# Patient Record
Sex: FEMALE | Race: WHITE
[De-identification: names, ages, dates, MRNs, and addresses within clinical notes are randomized per-mention and may not be internally consistent; named-entity substitution may affect disease eponyms.]

---

## 2018-11-28 ENCOUNTER — HOSPITAL ENCOUNTER (OUTPATIENT)
Age: 63
End: 2018-11-28
Payer: COMMERCIAL

## 2018-11-28 DIAGNOSIS — Z12.31: Primary | ICD-10-CM

## 2018-11-28 PROCEDURE — 77067 SCR MAMMO BI INCL CAD: CPT

## 2018-12-18 ENCOUNTER — HOSPITAL ENCOUNTER (OUTPATIENT)
Age: 63
End: 2018-12-18
Payer: COMMERCIAL

## 2018-12-18 DIAGNOSIS — D64.9: ICD-10-CM

## 2018-12-18 DIAGNOSIS — Z78.0: Primary | ICD-10-CM

## 2018-12-18 DIAGNOSIS — Z13.820: ICD-10-CM

## 2018-12-18 LAB
ALBUMIN LEVEL: 3.3 GM/DL (ref 3.4–5)
ALBUMIN/GLOB SERPL: 1 {RATIO} (ref 1.1–1.8)
ALP ISO SERPL-ACNC: 145 U/L (ref 46–116)
ALT SERPLBLD-CCNC: 37 U/L (ref 12–78)
ANION GAP SERPL CALC-SCNC: 16.8 MEQ/L (ref 5–15)
AST SERPL QL: 27 U/L (ref 15–37)
BILIRUBIN,TOTAL: 0.4 MG/DL (ref 0.2–1)
BUN SERPL-MCNC: 13 MG/DL (ref 7–18)
CALCIUM SPEC-MCNC: 9 MG/DL (ref 8.5–10.1)
CHLORIDE SPEC-SCNC: 102 MMOL/L (ref 98–107)
CO2 SERPL-SCNC: 26 MMOL/L (ref 21–32)
CREAT BLD-SCNC: 1.02 MG/DL (ref 0.55–1.02)
ESTIMATED GLOMERULAR FILT RATE: 55 ML/MIN (ref 60–?)
GFR (AFRICAN AMERICAN): 66 ML/MIN (ref 60–?)
GLOBULIN SER CALC-MCNC: 3.3 GM/DL (ref 1.3–3.2)
GLUCOSE: 115 MG/DL (ref 74–106)
HCT VFR BLD CALC: 41.1 % (ref 37–47)
HGB BLD-MCNC: 12.9 G/DL (ref 12.2–16.2)
MCHC RBC-ENTMCNC: 31.3 G/DL (ref 31.8–35.4)
MCV RBC: 82 FL (ref 81–99)
MEAN CORPUSCULAR HEMOGLOBIN: 25.7 PG (ref 27–31.2)
PLATELET # BLD: 334 K/MM3 (ref 142–424)
POTASSIUM: 3.8 MMOL/L (ref 3.5–5.1)
PROT SERPL-MCNC: 6.6 GM/DL (ref 6.4–8.2)
RBC # BLD AUTO: 5.01 M/MM3 (ref 4.2–5.4)
SODIUM SPEC-SCNC: 141 MMOL/L (ref 136–145)
TSH SERPL-ACNC: 0.68 UIU/ML (ref 0.36–3.74)
WBC # BLD AUTO: 6.6 K/MM3 (ref 4.8–10.8)

## 2018-12-18 PROCEDURE — 84443 ASSAY THYROID STIM HORMONE: CPT

## 2018-12-18 PROCEDURE — 36415 COLL VENOUS BLD VENIPUNCTURE: CPT

## 2018-12-18 PROCEDURE — 80053 COMPREHEN METABOLIC PANEL: CPT

## 2018-12-18 PROCEDURE — 85025 COMPLETE CBC W/AUTO DIFF WBC: CPT

## 2018-12-18 PROCEDURE — 77080 DXA BONE DENSITY AXIAL: CPT

## 2019-01-21 ENCOUNTER — HOSPITAL ENCOUNTER (OUTPATIENT)
Age: 64
End: 2019-01-21
Payer: COMMERCIAL

## 2019-01-21 DIAGNOSIS — R19.7: Primary | ICD-10-CM

## 2019-01-21 PROCEDURE — 87507 IADNA-DNA/RNA PROBE TQ 12-25: CPT

## 2019-10-24 ENCOUNTER — HOSPITAL ENCOUNTER (OUTPATIENT)
Age: 64
End: 2019-10-24
Payer: COMMERCIAL

## 2019-10-24 DIAGNOSIS — R40.0: ICD-10-CM

## 2019-10-24 DIAGNOSIS — R53.83: ICD-10-CM

## 2019-10-24 DIAGNOSIS — I10: ICD-10-CM

## 2019-10-24 DIAGNOSIS — G47.33: Primary | ICD-10-CM

## 2019-10-24 PROCEDURE — 95806 SLEEP STUDY UNATT&RESP EFFT: CPT

## 2019-12-30 ENCOUNTER — HOSPITAL ENCOUNTER (OUTPATIENT)
Age: 64
End: 2019-12-30
Payer: COMMERCIAL

## 2019-12-30 DIAGNOSIS — Z12.31: Primary | ICD-10-CM

## 2019-12-30 PROCEDURE — 77067 SCR MAMMO BI INCL CAD: CPT

## 2020-01-03 ENCOUNTER — HOSPITAL ENCOUNTER (OUTPATIENT)
Dept: HOSPITAL 22 - PT | Age: 65
Discharge: HOME | End: 2020-01-03
Payer: COMMERCIAL

## 2020-01-03 DIAGNOSIS — G45.9: ICD-10-CM

## 2020-01-03 DIAGNOSIS — M79.605: Primary | ICD-10-CM

## 2020-01-03 PROCEDURE — 97110 THERAPEUTIC EXERCISES: CPT

## 2020-01-03 PROCEDURE — 97163 PT EVAL HIGH COMPLEX 45 MIN: CPT

## 2020-01-14 ENCOUNTER — HOSPITAL ENCOUNTER (OUTPATIENT)
Age: 65
End: 2020-01-14
Payer: COMMERCIAL

## 2020-01-14 VITALS — BODY MASS INDEX: 53.9 KG/M2

## 2020-01-14 DIAGNOSIS — Z71.3: Primary | ICD-10-CM

## 2020-01-14 DIAGNOSIS — E66.9: ICD-10-CM

## 2020-01-14 DIAGNOSIS — E11.9: ICD-10-CM

## 2020-01-14 PROCEDURE — 97802 MEDICAL NUTRITION INDIV IN: CPT

## 2020-01-15 ENCOUNTER — HOSPITAL ENCOUNTER (OUTPATIENT)
Age: 65
End: 2020-01-15
Payer: COMMERCIAL

## 2020-01-15 DIAGNOSIS — Z13.820: ICD-10-CM

## 2020-01-15 DIAGNOSIS — Z78.0: Primary | ICD-10-CM

## 2020-01-15 PROCEDURE — 77080 DXA BONE DENSITY AXIAL: CPT

## 2020-01-17 ENCOUNTER — HOSPITAL ENCOUNTER (OUTPATIENT)
Age: 65
End: 2020-01-17
Payer: COMMERCIAL

## 2020-01-17 DIAGNOSIS — R06.00: ICD-10-CM

## 2020-01-17 DIAGNOSIS — R94.31: ICD-10-CM

## 2020-01-17 DIAGNOSIS — R07.9: ICD-10-CM

## 2020-01-17 DIAGNOSIS — R00.2: Primary | ICD-10-CM

## 2020-01-17 PROCEDURE — 93270 REMOTE 30 DAY ECG REV/REPORT: CPT

## 2020-01-22 ENCOUNTER — HOSPITAL ENCOUNTER (OUTPATIENT)
Age: 65
End: 2020-01-22
Payer: COMMERCIAL

## 2020-01-22 DIAGNOSIS — R06.02: Primary | ICD-10-CM

## 2020-01-22 DIAGNOSIS — Z13.6: ICD-10-CM

## 2020-01-22 PROCEDURE — 75571 CT HRT W/O DYE W/CA TEST: CPT

## 2020-01-24 ENCOUNTER — HOSPITAL ENCOUNTER (OUTPATIENT)
Age: 65
End: 2020-01-24
Payer: COMMERCIAL

## 2020-01-24 DIAGNOSIS — Z82.49: ICD-10-CM

## 2020-01-24 DIAGNOSIS — Z79.84: ICD-10-CM

## 2020-01-24 DIAGNOSIS — R94.31: ICD-10-CM

## 2020-01-24 DIAGNOSIS — R06.00: Primary | ICD-10-CM

## 2020-01-24 DIAGNOSIS — R42: ICD-10-CM

## 2020-01-24 DIAGNOSIS — E11.9: ICD-10-CM

## 2020-01-24 DIAGNOSIS — Z86.73: ICD-10-CM

## 2020-01-24 DIAGNOSIS — I10: ICD-10-CM

## 2020-01-24 DIAGNOSIS — R07.9: ICD-10-CM

## 2020-01-24 DIAGNOSIS — G47.33: ICD-10-CM

## 2020-01-24 PROCEDURE — 93880 EXTRACRANIAL BILAT STUDY: CPT

## 2020-01-24 PROCEDURE — 93306 TTE W/DOPPLER COMPLETE: CPT

## 2020-01-24 PROCEDURE — 78452 HT MUSCLE IMAGE SPECT MULT: CPT

## 2020-01-24 PROCEDURE — A9502 TC99M TETROFOSMIN: HCPCS

## 2020-01-24 PROCEDURE — 93017 CV STRESS TEST TRACING ONLY: CPT

## 2020-02-14 ENCOUNTER — HOSPITAL ENCOUNTER (OUTPATIENT)
Age: 65
End: 2020-02-14
Payer: COMMERCIAL

## 2020-02-14 DIAGNOSIS — R06.00: Primary | ICD-10-CM

## 2020-02-14 LAB
ANION GAP SERPL CALC-SCNC: 15.3 MEQ/L (ref 5–15)
BUN SERPL-MCNC: 16 MG/DL (ref 7–18)
CALCIUM SPEC-MCNC: 9.3 MG/DL (ref 8.5–10.1)
CHLORIDE SPEC-SCNC: 105 MMOL/L (ref 98–107)
CO2 SERPL-SCNC: 27 MMOL/L (ref 21–32)
CREAT BLD-SCNC: 1.06 MG/DL (ref 0.55–1.02)
ESTIMATED GLOMERULAR FILT RATE: 52 ML/MIN (ref 60–?)
GFR (AFRICAN AMERICAN): 63 ML/MIN (ref 60–?)
GLUCOSE: 97 MG/DL (ref 74–106)
POTASSIUM: 4.3 MMOL/L (ref 3.5–5.1)
SODIUM SPEC-SCNC: 143 MMOL/L (ref 137–145)

## 2020-02-14 PROCEDURE — 36415 COLL VENOUS BLD VENIPUNCTURE: CPT

## 2020-02-14 PROCEDURE — 80048 BASIC METABOLIC PNL TOTAL CA: CPT

## 2020-02-26 ENCOUNTER — HOSPITAL ENCOUNTER (OUTPATIENT)
Age: 65
End: 2020-02-26
Payer: COMMERCIAL

## 2020-02-26 DIAGNOSIS — M25.551: ICD-10-CM

## 2020-02-26 DIAGNOSIS — S99.922A: Primary | ICD-10-CM

## 2020-02-26 DIAGNOSIS — M54.41: ICD-10-CM

## 2020-02-26 PROCEDURE — 73502 X-RAY EXAM HIP UNI 2-3 VIEWS: CPT

## 2020-02-26 PROCEDURE — 72110 X-RAY EXAM L-2 SPINE 4/>VWS: CPT

## 2020-02-26 PROCEDURE — 73610 X-RAY EXAM OF ANKLE: CPT

## 2020-02-26 PROCEDURE — 73630 X-RAY EXAM OF FOOT: CPT

## 2020-02-27 ENCOUNTER — HOSPITAL ENCOUNTER (OUTPATIENT)
Dept: HOSPITAL 22 - PT | Age: 65
Discharge: HOME | End: 2020-02-27
Payer: COMMERCIAL

## 2020-02-27 DIAGNOSIS — S92.355A: Primary | ICD-10-CM

## 2020-02-27 PROCEDURE — 97760 ORTHOTIC MGMT&TRAING 1ST ENC: CPT

## 2020-03-18 ENCOUNTER — HOSPITAL ENCOUNTER (OUTPATIENT)
Age: 65
End: 2020-03-18
Payer: COMMERCIAL

## 2020-03-18 DIAGNOSIS — E78.2: ICD-10-CM

## 2020-03-18 DIAGNOSIS — E11.9: ICD-10-CM

## 2020-03-18 DIAGNOSIS — Z79.84: ICD-10-CM

## 2020-03-18 DIAGNOSIS — E03.9: Primary | ICD-10-CM

## 2020-03-18 DIAGNOSIS — E55.9: ICD-10-CM

## 2020-03-18 LAB
ALBUMIN LEVEL: 3.8 G/DL (ref 3.5–5)
ALBUMIN/GLOB SERPL: 1.6 {RATIO} (ref 1.1–1.8)
ALP ISO SERPL-ACNC: 138 U/L (ref 38–126)
ALT SERPLBLD-CCNC: 30 U/L (ref 12–78)
ANION GAP SERPL CALC-SCNC: 11.8 MEQ/L (ref 5–15)
AST SERPL QL: 29 U/L (ref 14–36)
BILIRUBIN,TOTAL: 0.4 MG/DL (ref 0.2–1.3)
BUN SERPL-MCNC: 13 MG/DL (ref 7–17)
CALCIUM SPEC-MCNC: 9.3 MG/DL (ref 8.4–10.2)
CHLORIDE SPEC-SCNC: 99 MMOL/L (ref 98–107)
CHOLEST SPEC-SCNC: 144 MG/DL (ref 140–200)
CO2 SERPL-SCNC: 31 MMOL/L (ref 22–30)
CREAT BLD-SCNC: 0.7 MG/DL (ref 0.52–1.04)
ESTIMATED GLOMERULAR FILT RATE: 85 ML/MIN (ref 60–?)
GFR (AFRICAN AMERICAN): 102 ML/MIN (ref 60–?)
GLOBULIN SER CALC-MCNC: 2.4 G/DL (ref 1.3–3.2)
GLUCOSE: 117 MG/DL (ref 74–100)
HBA1C MFR BLD: 6.1 % (ref 4–6)
HDLC SERPL-MCNC: 56 MG/DL (ref 40–60)
POTASSIUM: 3.8 MMOL/L (ref 3.5–5.1)
PROT SERPL-MCNC: 6.2 G/DL (ref 6.3–8.2)
SODIUM SPEC-SCNC: 138 MMOL/L (ref 136–145)
T4 FREE SERPL-MCNC: 1.12 NG/DL (ref 0.78–2.19)
TRIGLYCERIDES: 108 MG/DL (ref 30–150)
TSH SERPL-ACNC: 0.35 UIU/ML (ref 0.47–4.68)

## 2020-03-18 PROCEDURE — 84443 ASSAY THYROID STIM HORMONE: CPT

## 2020-03-18 PROCEDURE — 82607 VITAMIN B-12: CPT

## 2020-03-18 PROCEDURE — 83036 HEMOGLOBIN GLYCOSYLATED A1C: CPT

## 2020-03-18 PROCEDURE — 80053 COMPREHEN METABOLIC PANEL: CPT

## 2020-03-18 PROCEDURE — 36415 COLL VENOUS BLD VENIPUNCTURE: CPT

## 2020-03-18 PROCEDURE — 82652 VIT D 1 25-DIHYDROXY: CPT

## 2020-03-18 PROCEDURE — 80061 LIPID PANEL: CPT

## 2020-03-18 PROCEDURE — 84439 ASSAY OF FREE THYROXINE: CPT

## 2020-03-20 LAB — VITAMIN B12: 423 PG/ML (ref 232–1245)

## 2020-03-24 ENCOUNTER — HOSPITAL ENCOUNTER (OUTPATIENT)
Age: 65
End: 2020-03-24
Payer: COMMERCIAL

## 2020-03-24 DIAGNOSIS — S92.355D: Primary | ICD-10-CM

## 2020-03-24 PROCEDURE — 73630 X-RAY EXAM OF FOOT: CPT

## 2020-03-30 ENCOUNTER — HOSPITAL ENCOUNTER (OUTPATIENT)
Dept: HOSPITAL 22 - PT | Age: 65
Discharge: HOME | End: 2020-03-30
Payer: COMMERCIAL

## 2020-03-30 DIAGNOSIS — M25.551: ICD-10-CM

## 2020-03-30 DIAGNOSIS — M54.41: Primary | ICD-10-CM

## 2020-03-30 PROCEDURE — 97035 APP MDLTY 1+ULTRASOUND EA 15: CPT

## 2020-03-30 PROCEDURE — 97014 ELECTRIC STIMULATION THERAPY: CPT

## 2020-03-30 PROCEDURE — 97110 THERAPEUTIC EXERCISES: CPT

## 2020-03-30 PROCEDURE — 97010 HOT OR COLD PACKS THERAPY: CPT

## 2020-03-30 PROCEDURE — G0283 ELEC STIM OTHER THAN WOUND: HCPCS

## 2020-03-30 PROCEDURE — 97033 APP MDLTY 1+IONTPHRSIS EA 15: CPT

## 2020-03-30 PROCEDURE — 97140 MANUAL THERAPY 1/> REGIONS: CPT

## 2020-03-30 PROCEDURE — 97163 PT EVAL HIGH COMPLEX 45 MIN: CPT

## 2020-04-23 ENCOUNTER — HOSPITAL ENCOUNTER (OUTPATIENT)
Age: 65
End: 2020-04-23
Payer: COMMERCIAL

## 2020-04-23 DIAGNOSIS — S92.355A: Primary | ICD-10-CM

## 2020-04-23 PROCEDURE — 73630 X-RAY EXAM OF FOOT: CPT

## 2020-05-28 ENCOUNTER — HOSPITAL ENCOUNTER (OUTPATIENT)
Age: 65
End: 2020-05-28
Payer: COMMERCIAL

## 2020-05-28 DIAGNOSIS — S92.355D: Primary | ICD-10-CM

## 2020-05-28 PROCEDURE — 73630 X-RAY EXAM OF FOOT: CPT

## 2020-06-18 ENCOUNTER — HOSPITAL ENCOUNTER (OUTPATIENT)
Age: 65
End: 2020-06-18
Payer: COMMERCIAL

## 2020-06-18 DIAGNOSIS — S92.355D: Primary | ICD-10-CM

## 2020-06-18 PROCEDURE — 73630 X-RAY EXAM OF FOOT: CPT

## 2020-08-05 ENCOUNTER — HOSPITAL ENCOUNTER (OUTPATIENT)
Dept: HOSPITAL 22 - PT | Age: 65
Discharge: HOME | End: 2020-08-05
Payer: COMMERCIAL

## 2020-08-05 DIAGNOSIS — S92.355D: Primary | ICD-10-CM

## 2020-08-05 PROCEDURE — 97014 ELECTRIC STIMULATION THERAPY: CPT

## 2020-08-05 PROCEDURE — G0283 ELEC STIM OTHER THAN WOUND: HCPCS

## 2020-08-05 PROCEDURE — 97760 ORTHOTIC MGMT&TRAING 1ST ENC: CPT

## 2020-08-05 PROCEDURE — 97010 HOT OR COLD PACKS THERAPY: CPT

## 2020-08-05 PROCEDURE — 97163 PT EVAL HIGH COMPLEX 45 MIN: CPT

## 2020-08-05 PROCEDURE — 97110 THERAPEUTIC EXERCISES: CPT

## 2020-08-05 PROCEDURE — 97113 AQUATIC THERAPY/EXERCISES: CPT

## 2020-08-05 PROCEDURE — 97164 PT RE-EVAL EST PLAN CARE: CPT

## 2020-08-05 PROCEDURE — 97035 APP MDLTY 1+ULTRASOUND EA 15: CPT

## 2020-08-05 PROCEDURE — 97530 THERAPEUTIC ACTIVITIES: CPT

## 2020-08-05 PROCEDURE — 97140 MANUAL THERAPY 1/> REGIONS: CPT

## 2020-08-28 ENCOUNTER — HOSPITAL ENCOUNTER (OUTPATIENT)
Age: 65
End: 2020-08-28
Payer: COMMERCIAL

## 2020-08-28 DIAGNOSIS — Z79.84: ICD-10-CM

## 2020-08-28 DIAGNOSIS — I25.10: ICD-10-CM

## 2020-08-28 DIAGNOSIS — Z86.73: ICD-10-CM

## 2020-08-28 DIAGNOSIS — I35.8: ICD-10-CM

## 2020-08-28 DIAGNOSIS — G47.33: ICD-10-CM

## 2020-08-28 DIAGNOSIS — Z82.49: ICD-10-CM

## 2020-08-28 DIAGNOSIS — I35.0: ICD-10-CM

## 2020-08-28 DIAGNOSIS — E11.9: ICD-10-CM

## 2020-08-28 DIAGNOSIS — I10: ICD-10-CM

## 2020-08-28 DIAGNOSIS — E78.2: ICD-10-CM

## 2020-08-28 DIAGNOSIS — R00.2: ICD-10-CM

## 2020-08-28 DIAGNOSIS — I65.23: ICD-10-CM

## 2020-08-28 DIAGNOSIS — R06.02: Primary | ICD-10-CM

## 2020-08-28 LAB
ANION GAP SERPL CALC-SCNC: 15.9 MEQ/L (ref 5–15)
BUN SERPL-MCNC: 17 MG/DL (ref 7–17)
CALCIUM SPEC-MCNC: 9.4 MG/DL (ref 8.4–10.2)
CHLORIDE SPEC-SCNC: 101 MMOL/L (ref 98–107)
CO2 SERPL-SCNC: 27 MMOL/L (ref 22–30)
CREAT BLD-SCNC: 1.2 MG/DL (ref 0.52–1.04)
ESTIMATED GLOMERULAR FILT RATE: 45 ML/MIN (ref 60–?)
GFR (AFRICAN AMERICAN): 55 ML/MIN (ref 60–?)
GLUCOSE: 92 MG/DL (ref 74–100)
NT PRO BRAIN NATRIURETIC PEP.: 62.4 PG/ML (ref 0–125)
POTASSIUM: 4.9 MMOL/L (ref 3.5–5.1)
SODIUM SPEC-SCNC: 139 MMOL/L (ref 136–145)

## 2020-08-28 PROCEDURE — 80048 BASIC METABOLIC PNL TOTAL CA: CPT

## 2020-08-28 PROCEDURE — 83880 ASSAY OF NATRIURETIC PEPTIDE: CPT

## 2020-08-28 PROCEDURE — 36415 COLL VENOUS BLD VENIPUNCTURE: CPT

## 2020-10-02 ENCOUNTER — HOSPITAL ENCOUNTER (EMERGENCY)
Age: 65
Discharge: HOME | End: 2020-10-02
Payer: COMMERCIAL

## 2020-10-02 VITALS
HEART RATE: 65 BPM | SYSTOLIC BLOOD PRESSURE: 130 MMHG | RESPIRATION RATE: 17 BRPM | OXYGEN SATURATION: 95 % | DIASTOLIC BLOOD PRESSURE: 70 MMHG | TEMPERATURE: 98.24 F

## 2020-10-02 VITALS
TEMPERATURE: 98 F | HEART RATE: 77 BPM | SYSTOLIC BLOOD PRESSURE: 136 MMHG | RESPIRATION RATE: 18 BRPM | OXYGEN SATURATION: 93 % | DIASTOLIC BLOOD PRESSURE: 80 MMHG

## 2020-10-02 VITALS
HEART RATE: 67 BPM | RESPIRATION RATE: 20 BRPM | SYSTOLIC BLOOD PRESSURE: 132 MMHG | OXYGEN SATURATION: 96 % | DIASTOLIC BLOOD PRESSURE: 71 MMHG

## 2020-10-02 VITALS — BODY MASS INDEX: 55.5 KG/M2

## 2020-10-02 DIAGNOSIS — Z90.710: ICD-10-CM

## 2020-10-02 DIAGNOSIS — I10: ICD-10-CM

## 2020-10-02 DIAGNOSIS — K21.9: ICD-10-CM

## 2020-10-02 DIAGNOSIS — Z79.899: ICD-10-CM

## 2020-10-02 DIAGNOSIS — E78.5: ICD-10-CM

## 2020-10-02 DIAGNOSIS — J45.909: ICD-10-CM

## 2020-10-02 DIAGNOSIS — F33.1: ICD-10-CM

## 2020-10-02 DIAGNOSIS — M54.16: Primary | ICD-10-CM

## 2020-10-02 DIAGNOSIS — Z90.49: ICD-10-CM

## 2020-10-02 DIAGNOSIS — Z86.73: ICD-10-CM

## 2020-10-02 DIAGNOSIS — E03.9: ICD-10-CM

## 2020-10-02 DIAGNOSIS — E11.9: ICD-10-CM

## 2020-10-02 PROCEDURE — 99282 EMERGENCY DEPT VISIT SF MDM: CPT

## 2020-10-23 ENCOUNTER — HOSPITAL ENCOUNTER (OUTPATIENT)
Age: 65
End: 2020-10-23
Payer: COMMERCIAL

## 2020-10-23 DIAGNOSIS — I10: ICD-10-CM

## 2020-10-23 DIAGNOSIS — I65.29: ICD-10-CM

## 2020-10-23 DIAGNOSIS — I25.10: ICD-10-CM

## 2020-10-23 DIAGNOSIS — R06.00: Primary | ICD-10-CM

## 2020-10-23 DIAGNOSIS — E78.5: ICD-10-CM

## 2020-10-23 DIAGNOSIS — I35.8: ICD-10-CM

## 2020-10-23 LAB
ANION GAP SERPL CALC-SCNC: 13.7 MEQ/L (ref 5–15)
BUN SERPL-MCNC: 14 MG/DL (ref 7–17)
CALCIUM SPEC-MCNC: 9.3 MG/DL (ref 8.4–10.2)
CHLORIDE SPEC-SCNC: 102 MMOL/L (ref 98–107)
CO2 SERPL-SCNC: 27 MMOL/L (ref 22–30)
CREAT BLD-SCNC: 0.8 MG/DL (ref 0.52–1.04)
ESTIMATED GLOMERULAR FILT RATE: 72 ML/MIN (ref 60–?)
GFR (AFRICAN AMERICAN): 87 ML/MIN (ref 60–?)
GLUCOSE: 116 MG/DL (ref 74–100)
POTASSIUM: 4.7 MMOL/L (ref 3.5–5.1)
SODIUM SPEC-SCNC: 138 MMOL/L (ref 136–145)

## 2020-10-23 PROCEDURE — 80048 BASIC METABOLIC PNL TOTAL CA: CPT

## 2020-10-23 PROCEDURE — 36415 COLL VENOUS BLD VENIPUNCTURE: CPT

## 2020-11-24 ENCOUNTER — HOSPITAL ENCOUNTER (OUTPATIENT)
Age: 65
End: 2020-11-24
Payer: COMMERCIAL

## 2020-11-24 DIAGNOSIS — R00.2: ICD-10-CM

## 2020-11-24 DIAGNOSIS — I65.29: ICD-10-CM

## 2020-11-24 DIAGNOSIS — E11.9: ICD-10-CM

## 2020-11-24 DIAGNOSIS — I25.10: ICD-10-CM

## 2020-11-24 DIAGNOSIS — Z79.84: ICD-10-CM

## 2020-11-24 DIAGNOSIS — E78.5: ICD-10-CM

## 2020-11-24 DIAGNOSIS — I10: ICD-10-CM

## 2020-11-24 DIAGNOSIS — Z86.73: ICD-10-CM

## 2020-11-24 DIAGNOSIS — I35.0: ICD-10-CM

## 2020-11-24 DIAGNOSIS — I35.8: ICD-10-CM

## 2020-11-24 DIAGNOSIS — R06.00: Primary | ICD-10-CM

## 2020-11-24 DIAGNOSIS — G47.33: ICD-10-CM

## 2020-11-24 DIAGNOSIS — R60.9: ICD-10-CM

## 2020-11-24 PROCEDURE — 93306 TTE W/DOPPLER COMPLETE: CPT

## 2020-12-04 ENCOUNTER — HOSPITAL ENCOUNTER (OUTPATIENT)
Age: 65
End: 2020-12-04
Payer: COMMERCIAL

## 2020-12-04 DIAGNOSIS — Z03.818: Primary | ICD-10-CM

## 2020-12-04 LAB
HCT VFR BLD CALC: 43.5 % (ref 37–47)
HGB BLD-MCNC: 13.2 G/DL (ref 12.2–16.2)
MCHC RBC-ENTMCNC: 30.3 G/DL (ref 31.8–35.4)
MCV RBC: 81.4 FL (ref 81–99)
MEAN CORPUSCULAR HEMOGLOBIN: 24.7 PG (ref 27–31.2)
PLATELET # BLD: 430 K/MM3 (ref 142–424)
RBC # BLD AUTO: 5.35 M/MM3 (ref 4.2–5.4)
WBC # BLD AUTO: 10.1 K/MM3 (ref 4.8–10.8)

## 2020-12-04 PROCEDURE — 36415 COLL VENOUS BLD VENIPUNCTURE: CPT

## 2020-12-04 PROCEDURE — U0004 COV-19 TEST NON-CDC HGH THRU: HCPCS

## 2020-12-04 PROCEDURE — 85025 COMPLETE CBC W/AUTO DIFF WBC: CPT

## 2020-12-18 ENCOUNTER — HOSPITAL ENCOUNTER (OUTPATIENT)
Age: 65
End: 2020-12-18
Payer: COMMERCIAL

## 2020-12-18 DIAGNOSIS — I65.23: ICD-10-CM

## 2020-12-18 DIAGNOSIS — Z82.49: ICD-10-CM

## 2020-12-18 DIAGNOSIS — I25.10: ICD-10-CM

## 2020-12-18 DIAGNOSIS — R06.02: Primary | ICD-10-CM

## 2020-12-18 DIAGNOSIS — I35.8: ICD-10-CM

## 2020-12-18 DIAGNOSIS — G47.33: ICD-10-CM

## 2020-12-18 DIAGNOSIS — Z79.84: ICD-10-CM

## 2020-12-18 DIAGNOSIS — R00.2: ICD-10-CM

## 2020-12-18 DIAGNOSIS — E11.9: ICD-10-CM

## 2020-12-18 DIAGNOSIS — R60.9: ICD-10-CM

## 2020-12-18 DIAGNOSIS — I10: ICD-10-CM

## 2020-12-18 DIAGNOSIS — Z86.73: ICD-10-CM

## 2020-12-18 DIAGNOSIS — E78.2: ICD-10-CM

## 2020-12-18 DIAGNOSIS — I35.0: ICD-10-CM

## 2020-12-18 LAB
ANION GAP SERPL CALC-SCNC: 13.6 MEQ/L (ref 5–15)
BUN SERPL-MCNC: 16 MG/DL (ref 7–17)
CALCIUM SPEC-MCNC: 9.8 MG/DL (ref 8.4–10.2)
CHLORIDE SPEC-SCNC: 103 MMOL/L (ref 98–107)
CO2 SERPL-SCNC: 27 MMOL/L (ref 22–30)
CREAT BLD-SCNC: 0.9 MG/DL (ref 0.52–1.04)
ESTIMATED GLOMERULAR FILT RATE: 63 ML/MIN (ref 60–?)
GFR (AFRICAN AMERICAN): 76 ML/MIN (ref 60–?)
GLUCOSE: 131 MG/DL (ref 74–100)
POTASSIUM: 4.6 MMOL/L (ref 3.5–5.1)
SODIUM SPEC-SCNC: 139 MMOL/L (ref 136–145)

## 2020-12-18 PROCEDURE — 36415 COLL VENOUS BLD VENIPUNCTURE: CPT

## 2020-12-18 PROCEDURE — 80048 BASIC METABOLIC PNL TOTAL CA: CPT

## 2020-12-31 ENCOUNTER — HOSPITAL ENCOUNTER (OUTPATIENT)
Age: 65
End: 2020-12-31
Payer: COMMERCIAL

## 2020-12-31 DIAGNOSIS — Z12.31: Primary | ICD-10-CM

## 2020-12-31 PROCEDURE — 77063 BREAST TOMOSYNTHESIS BI: CPT

## 2020-12-31 PROCEDURE — 77067 SCR MAMMO BI INCL CAD: CPT

## 2021-01-04 ENCOUNTER — HOSPITAL ENCOUNTER (OUTPATIENT)
Age: 66
End: 2021-01-04
Payer: COMMERCIAL

## 2021-01-04 DIAGNOSIS — Z79.84: ICD-10-CM

## 2021-01-04 DIAGNOSIS — J02.9: ICD-10-CM

## 2021-01-04 DIAGNOSIS — Z03.818: Primary | ICD-10-CM

## 2021-01-04 DIAGNOSIS — E11.9: ICD-10-CM

## 2021-01-04 LAB
ALBUMIN LEVEL: 3.9 G/DL (ref 3.5–5)
ALBUMIN/GLOB SERPL: 1.4 {RATIO} (ref 1.1–1.8)
ALP ISO SERPL-ACNC: 169 U/L (ref 38–126)
ALT SERPLBLD-CCNC: 41 U/L (ref 12–78)
ANION GAP SERPL CALC-SCNC: 13 MEQ/L (ref 5–15)
AST SERPL QL: 38 U/L (ref 14–36)
BILIRUBIN,TOTAL: 0.6 MG/DL (ref 0.2–1.3)
BUN SERPL-MCNC: 10 MG/DL (ref 7–17)
CALCIUM SPEC-MCNC: 9.5 MG/DL (ref 8.4–10.2)
CHLORIDE SPEC-SCNC: 99 MMOL/L (ref 98–107)
CO2 SERPL-SCNC: 26 MMOL/L (ref 22–30)
CREAT BLD-SCNC: 0.8 MG/DL (ref 0.52–1.04)
ESTIMATED GLOMERULAR FILT RATE: 72 ML/MIN (ref 60–?)
GFR (AFRICAN AMERICAN): 87 ML/MIN (ref 60–?)
GLOBULIN SER CALC-MCNC: 2.8 G/DL (ref 1.3–3.2)
GLUCOSE: 108 MG/DL (ref 74–100)
HBA1C MFR BLD: 6.6 % (ref 4–6)
HCT VFR BLD CALC: 39.7 % (ref 37–47)
HGB BLD-MCNC: 12.6 G/DL (ref 12.2–16.2)
MCHC RBC-ENTMCNC: 31.8 G/DL (ref 31.8–35.4)
MCV RBC: 79.9 FL (ref 81–99)
MEAN CORPUSCULAR HEMOGLOBIN: 25.4 PG (ref 27–31.2)
PLATELET # BLD: 437 K/MM3 (ref 142–424)
POTASSIUM: 4 MMOL/L (ref 3.5–5.1)
PROT SERPL-MCNC: 6.7 G/DL (ref 6.3–8.2)
RBC # BLD AUTO: 4.97 M/MM3 (ref 4.2–5.4)
SODIUM SPEC-SCNC: 134 MMOL/L (ref 136–145)
WBC # BLD AUTO: 10.1 K/MM3 (ref 4.8–10.8)

## 2021-01-04 PROCEDURE — 87430 STREP A AG IA: CPT

## 2021-01-04 PROCEDURE — 71045 X-RAY EXAM CHEST 1 VIEW: CPT

## 2021-01-04 PROCEDURE — 83036 HEMOGLOBIN GLYCOSYLATED A1C: CPT

## 2021-01-04 PROCEDURE — U0003 INFECTIOUS AGENT DETECTION BY NUCLEIC ACID (DNA OR RNA); SEVERE ACUTE RESPIRATORY SYNDROME CORONAVIRUS 2 (SARS-COV-2) (CORONAVIRUS DISEASE [COVID-19]), AMPLIFIED PROBE TECHNIQUE, MAKING USE OF HIGH THROUGHPUT TECHNOLOGIES AS DESCRIBED BY CMS-2020-01-R: HCPCS

## 2021-01-04 PROCEDURE — 85025 COMPLETE CBC W/AUTO DIFF WBC: CPT

## 2021-01-04 PROCEDURE — 36415 COLL VENOUS BLD VENIPUNCTURE: CPT

## 2021-01-04 PROCEDURE — 80053 COMPREHEN METABOLIC PANEL: CPT

## 2021-01-22 ENCOUNTER — HOSPITAL ENCOUNTER (OUTPATIENT)
Age: 66
End: 2021-01-22
Payer: COMMERCIAL

## 2021-01-22 DIAGNOSIS — Z79.84: ICD-10-CM

## 2021-01-22 DIAGNOSIS — I65.29: ICD-10-CM

## 2021-01-22 DIAGNOSIS — R06.00: Primary | ICD-10-CM

## 2021-01-22 DIAGNOSIS — I35.8: ICD-10-CM

## 2021-01-22 DIAGNOSIS — E11.9: ICD-10-CM

## 2021-01-22 DIAGNOSIS — G47.33: ICD-10-CM

## 2021-01-22 DIAGNOSIS — Z86.73: ICD-10-CM

## 2021-01-22 DIAGNOSIS — Z82.49: ICD-10-CM

## 2021-01-22 DIAGNOSIS — E78.5: ICD-10-CM

## 2021-01-22 DIAGNOSIS — I35.0: ICD-10-CM

## 2021-01-22 DIAGNOSIS — R60.9: ICD-10-CM

## 2021-01-22 DIAGNOSIS — I25.10: ICD-10-CM

## 2021-01-22 DIAGNOSIS — I10: ICD-10-CM

## 2021-01-22 DIAGNOSIS — R00.2: ICD-10-CM

## 2021-01-22 LAB
ANION GAP SERPL CALC-SCNC: 13.4 MEQ/L (ref 5–15)
BUN SERPL-MCNC: 14 MG/DL (ref 7–17)
CALCIUM SPEC-MCNC: 9.2 MG/DL (ref 8.4–10.2)
CHLORIDE SPEC-SCNC: 104 MMOL/L (ref 98–107)
CO2 SERPL-SCNC: 25 MMOL/L (ref 22–30)
CREAT BLD-SCNC: 0.9 MG/DL (ref 0.52–1.04)
ESTIMATED GLOMERULAR FILT RATE: 63 ML/MIN (ref 60–?)
GFR (AFRICAN AMERICAN): 76 ML/MIN (ref 60–?)
GLUCOSE: 102 MG/DL (ref 74–100)
MAGNESIUM: 1.9 MG/DL (ref 1.6–2.3)
POTASSIUM: 4.4 MMOL/L (ref 3.5–5.1)
SODIUM SPEC-SCNC: 138 MMOL/L (ref 136–145)

## 2021-01-22 PROCEDURE — 36415 COLL VENOUS BLD VENIPUNCTURE: CPT

## 2021-01-22 PROCEDURE — 83735 ASSAY OF MAGNESIUM: CPT

## 2021-01-22 PROCEDURE — 80048 BASIC METABOLIC PNL TOTAL CA: CPT

## 2021-02-19 ENCOUNTER — HOSPITAL ENCOUNTER (OUTPATIENT)
Age: 66
End: 2021-02-19
Payer: COMMERCIAL

## 2021-02-19 DIAGNOSIS — I25.10: ICD-10-CM

## 2021-02-19 DIAGNOSIS — E78.5: Primary | ICD-10-CM

## 2021-02-19 DIAGNOSIS — I35.8: ICD-10-CM

## 2021-02-19 DIAGNOSIS — I10: ICD-10-CM

## 2021-02-19 DIAGNOSIS — I65.29: ICD-10-CM

## 2021-02-19 DIAGNOSIS — R06.00: ICD-10-CM

## 2021-02-19 LAB
ANION GAP SERPL CALC-SCNC: 13.7 MEQ/L (ref 5–15)
BUN SERPL-MCNC: 16 MG/DL (ref 7–17)
CALCIUM SPEC-MCNC: 9.8 MG/DL (ref 8.4–10.2)
CHLORIDE SPEC-SCNC: 103 MMOL/L (ref 98–107)
CO2 SERPL-SCNC: 27 MMOL/L (ref 22–30)
CREAT BLD-SCNC: 1 MG/DL (ref 0.52–1.04)
ESTIMATED GLOMERULAR FILT RATE: 56 ML/MIN (ref 60–?)
GFR (AFRICAN AMERICAN): 68 ML/MIN (ref 60–?)
GLUCOSE: 152 MG/DL (ref 74–100)
POTASSIUM: 4.7 MMOL/L (ref 3.5–5.1)
SODIUM SPEC-SCNC: 139 MMOL/L (ref 136–145)

## 2021-02-19 PROCEDURE — 36415 COLL VENOUS BLD VENIPUNCTURE: CPT

## 2021-02-19 PROCEDURE — 80048 BASIC METABOLIC PNL TOTAL CA: CPT

## 2021-02-26 ENCOUNTER — HOSPITAL ENCOUNTER (OUTPATIENT)
Age: 66
End: 2021-02-26
Payer: COMMERCIAL

## 2021-02-26 DIAGNOSIS — R06.02: ICD-10-CM

## 2021-02-26 DIAGNOSIS — Z20.822: ICD-10-CM

## 2021-02-26 DIAGNOSIS — I27.20: ICD-10-CM

## 2021-02-26 DIAGNOSIS — Z01.810: Primary | ICD-10-CM

## 2021-02-26 LAB
ANION GAP SERPL CALC-SCNC: 14.6 MEQ/L (ref 5–15)
BUN SERPL-MCNC: 21 MG/DL (ref 7–17)
CALCIUM SPEC-MCNC: 9.6 MG/DL (ref 8.4–10.2)
CHLORIDE SPEC-SCNC: 101 MMOL/L (ref 98–107)
CO2 SERPL-SCNC: 29 MMOL/L (ref 22–30)
CREAT BLD-SCNC: 1.4 MG/DL (ref 0.52–1.04)
ESTIMATED GLOMERULAR FILT RATE: 38 ML/MIN (ref 60–?)
GFR (AFRICAN AMERICAN): 46 ML/MIN (ref 60–?)
GLUCOSE: 124 MG/DL (ref 74–100)
HCT VFR BLD CALC: 41 % (ref 37–47)
HGB BLD-MCNC: 12.4 G/DL (ref 12.2–16.2)
MCHC RBC-ENTMCNC: 30.2 G/DL (ref 31.8–35.4)
MCV RBC: 81.2 FL (ref 81–99)
MEAN CORPUSCULAR HEMOGLOBIN: 24.5 PG (ref 27–31.2)
PLATELET # BLD: 443 K/MM3 (ref 142–424)
POTASSIUM: 4.6 MMOL/L (ref 3.5–5.1)
RBC # BLD AUTO: 5.05 M/MM3 (ref 4.2–5.4)
SODIUM SPEC-SCNC: 140 MMOL/L (ref 136–145)
WBC # BLD AUTO: 11.3 K/MM3 (ref 4.8–10.8)

## 2021-02-26 PROCEDURE — 80048 BASIC METABOLIC PNL TOTAL CA: CPT

## 2021-02-26 PROCEDURE — 36415 COLL VENOUS BLD VENIPUNCTURE: CPT

## 2021-02-26 PROCEDURE — 85025 COMPLETE CBC W/AUTO DIFF WBC: CPT

## 2021-02-26 PROCEDURE — 86328 IA NFCT AB SARSCOV2 COVID19: CPT

## 2021-03-01 ENCOUNTER — HOSPITAL ENCOUNTER (OUTPATIENT)
Dept: HOSPITAL 22 - CATHLAB | Age: 66
Discharge: HOME | End: 2021-03-01
Payer: COMMERCIAL

## 2021-03-01 VITALS
DIASTOLIC BLOOD PRESSURE: 57 MMHG | HEART RATE: 72 BPM | RESPIRATION RATE: 18 BRPM | TEMPERATURE: 97.8 F | SYSTOLIC BLOOD PRESSURE: 98 MMHG | OXYGEN SATURATION: 96 %

## 2021-03-01 VITALS
OXYGEN SATURATION: 97 % | RESPIRATION RATE: 16 BRPM | HEART RATE: 72 BPM | DIASTOLIC BLOOD PRESSURE: 46 MMHG | SYSTOLIC BLOOD PRESSURE: 105 MMHG

## 2021-03-01 VITALS
DIASTOLIC BLOOD PRESSURE: 66 MMHG | HEART RATE: 64 BPM | OXYGEN SATURATION: 96 % | SYSTOLIC BLOOD PRESSURE: 103 MMHG | RESPIRATION RATE: 17 BRPM

## 2021-03-01 VITALS
RESPIRATION RATE: 16 BRPM | SYSTOLIC BLOOD PRESSURE: 90 MMHG | HEART RATE: 75 BPM | OXYGEN SATURATION: 96 % | DIASTOLIC BLOOD PRESSURE: 50 MMHG

## 2021-03-01 VITALS
DIASTOLIC BLOOD PRESSURE: 63 MMHG | HEART RATE: 66 BPM | RESPIRATION RATE: 15 BRPM | SYSTOLIC BLOOD PRESSURE: 98 MMHG | OXYGEN SATURATION: 96 %

## 2021-03-01 VITALS
SYSTOLIC BLOOD PRESSURE: 142 MMHG | TEMPERATURE: 97.6 F | OXYGEN SATURATION: 99 % | HEART RATE: 75 BPM | RESPIRATION RATE: 18 BRPM | DIASTOLIC BLOOD PRESSURE: 79 MMHG

## 2021-03-01 VITALS
HEART RATE: 72 BPM | SYSTOLIC BLOOD PRESSURE: 90 MMHG | RESPIRATION RATE: 17 BRPM | DIASTOLIC BLOOD PRESSURE: 52 MMHG | OXYGEN SATURATION: 96 %

## 2021-03-01 VITALS
HEART RATE: 70 BPM | SYSTOLIC BLOOD PRESSURE: 100 MMHG | DIASTOLIC BLOOD PRESSURE: 56 MMHG | OXYGEN SATURATION: 98 % | RESPIRATION RATE: 18 BRPM

## 2021-03-01 VITALS — BODY MASS INDEX: 57.5 KG/M2

## 2021-03-01 VITALS
DIASTOLIC BLOOD PRESSURE: 55 MMHG | RESPIRATION RATE: 17 BRPM | OXYGEN SATURATION: 95 % | SYSTOLIC BLOOD PRESSURE: 91 MMHG | HEART RATE: 68 BPM

## 2021-03-01 VITALS
RESPIRATION RATE: 17 BRPM | DIASTOLIC BLOOD PRESSURE: 55 MMHG | SYSTOLIC BLOOD PRESSURE: 97 MMHG | OXYGEN SATURATION: 97 % | HEART RATE: 68 BPM

## 2021-03-01 VITALS
DIASTOLIC BLOOD PRESSURE: 61 MMHG | SYSTOLIC BLOOD PRESSURE: 91 MMHG | HEART RATE: 66 BPM | RESPIRATION RATE: 19 BRPM | OXYGEN SATURATION: 97 %

## 2021-03-01 VITALS — HEART RATE: 75 BPM

## 2021-03-01 DIAGNOSIS — I35.0: ICD-10-CM

## 2021-03-01 DIAGNOSIS — R06.02: ICD-10-CM

## 2021-03-01 DIAGNOSIS — I10: ICD-10-CM

## 2021-03-01 DIAGNOSIS — I27.20: ICD-10-CM

## 2021-03-01 DIAGNOSIS — R00.2: ICD-10-CM

## 2021-03-01 DIAGNOSIS — E11.9: ICD-10-CM

## 2021-03-01 DIAGNOSIS — I65.23: ICD-10-CM

## 2021-03-01 DIAGNOSIS — I35.8: ICD-10-CM

## 2021-03-01 DIAGNOSIS — Z86.73: ICD-10-CM

## 2021-03-01 DIAGNOSIS — G47.33: ICD-10-CM

## 2021-03-01 DIAGNOSIS — Z79.899: ICD-10-CM

## 2021-03-01 DIAGNOSIS — I25.118: Primary | ICD-10-CM

## 2021-03-01 DIAGNOSIS — E78.2: ICD-10-CM

## 2021-03-01 DIAGNOSIS — R60.9: ICD-10-CM

## 2021-03-01 DIAGNOSIS — Z79.84: ICD-10-CM

## 2021-03-01 DIAGNOSIS — Z82.49: ICD-10-CM

## 2021-03-01 LAB
CATHL VENOUS O2 SAT: 64 % (ref 75–80)
SAO2 % BLDA: 62 % (ref 90–100)

## 2021-03-01 PROCEDURE — C1894 INTRO/SHEATH, NON-LASER: HCPCS

## 2021-03-01 PROCEDURE — 93460 R&L HRT ART/VENTRICLE ANGIO: CPT

## 2021-03-01 PROCEDURE — 82810 BLOOD GASES O2 SAT ONLY: CPT

## 2021-03-01 PROCEDURE — 99153 MOD SED SAME PHYS/QHP EA: CPT

## 2021-03-01 PROCEDURE — 99152 MOD SED SAME PHYS/QHP 5/>YRS: CPT

## 2021-03-01 PROCEDURE — C1760 CLOSURE DEV, VASC: HCPCS

## 2021-03-01 PROCEDURE — C1725 CATH, TRANSLUMIN NON-LASER: HCPCS

## 2021-03-01 PROCEDURE — C1769 GUIDE WIRE: HCPCS

## 2021-03-05 ENCOUNTER — HOSPITAL ENCOUNTER (OUTPATIENT)
Age: 66
End: 2021-03-05
Payer: COMMERCIAL

## 2021-03-05 DIAGNOSIS — I65.23: ICD-10-CM

## 2021-03-05 DIAGNOSIS — I35.8: ICD-10-CM

## 2021-03-05 DIAGNOSIS — I35.0: ICD-10-CM

## 2021-03-05 DIAGNOSIS — I20.8: ICD-10-CM

## 2021-03-05 DIAGNOSIS — R00.2: ICD-10-CM

## 2021-03-05 DIAGNOSIS — I25.10: ICD-10-CM

## 2021-03-05 DIAGNOSIS — Z82.49: ICD-10-CM

## 2021-03-05 DIAGNOSIS — E11.9: Primary | ICD-10-CM

## 2021-03-05 DIAGNOSIS — R60.9: ICD-10-CM

## 2021-03-05 DIAGNOSIS — I10: ICD-10-CM

## 2021-03-05 DIAGNOSIS — R06.02: ICD-10-CM

## 2021-03-05 DIAGNOSIS — Z86.73: ICD-10-CM

## 2021-03-05 DIAGNOSIS — G47.33: ICD-10-CM

## 2021-03-05 DIAGNOSIS — E78.2: ICD-10-CM

## 2021-03-05 DIAGNOSIS — I27.20: ICD-10-CM

## 2021-03-05 LAB
ANION GAP SERPL CALC-SCNC: 15.5 MEQ/L (ref 5–15)
BUN SERPL-MCNC: 14 MG/DL (ref 7–17)
CALCIUM SPEC-MCNC: 9.5 MG/DL (ref 8.4–10.2)
CHLORIDE SPEC-SCNC: 106 MMOL/L (ref 98–107)
CO2 SERPL-SCNC: 21 MMOL/L (ref 22–30)
CREAT BLD-SCNC: 1.2 MG/DL (ref 0.52–1.04)
ESTIMATED GLOMERULAR FILT RATE: 45 ML/MIN (ref 60–?)
GFR (AFRICAN AMERICAN): 55 ML/MIN (ref 60–?)
GLUCOSE: 117 MG/DL (ref 74–100)
POTASSIUM: 4.5 MMOL/L (ref 3.5–5.1)
SODIUM SPEC-SCNC: 138 MMOL/L (ref 136–145)

## 2021-03-05 PROCEDURE — 80048 BASIC METABOLIC PNL TOTAL CA: CPT

## 2021-03-05 PROCEDURE — 36415 COLL VENOUS BLD VENIPUNCTURE: CPT

## 2021-03-17 ENCOUNTER — HOSPITAL ENCOUNTER (OUTPATIENT)
Age: 66
End: 2021-03-17
Payer: COMMERCIAL

## 2021-03-17 DIAGNOSIS — I27.20: ICD-10-CM

## 2021-03-17 DIAGNOSIS — Z86.73: ICD-10-CM

## 2021-03-17 DIAGNOSIS — I35.0: ICD-10-CM

## 2021-03-17 DIAGNOSIS — R06.00: Primary | ICD-10-CM

## 2021-03-17 DIAGNOSIS — I25.10: ICD-10-CM

## 2021-03-17 DIAGNOSIS — R00.2: ICD-10-CM

## 2021-03-17 DIAGNOSIS — G47.33: ICD-10-CM

## 2021-03-17 DIAGNOSIS — Z82.49: ICD-10-CM

## 2021-03-17 DIAGNOSIS — R60.9: ICD-10-CM

## 2021-03-17 DIAGNOSIS — I35.8: ICD-10-CM

## 2021-03-17 DIAGNOSIS — I20.8: ICD-10-CM

## 2021-03-17 DIAGNOSIS — Z79.84: ICD-10-CM

## 2021-03-17 DIAGNOSIS — E11.9: ICD-10-CM

## 2021-03-17 DIAGNOSIS — I65.29: ICD-10-CM

## 2021-03-17 DIAGNOSIS — I10: ICD-10-CM

## 2021-03-17 DIAGNOSIS — E78.5: ICD-10-CM

## 2021-03-17 LAB
ANION GAP SERPL CALC-SCNC: 16.7 MEQ/L (ref 5–15)
BUN SERPL-MCNC: 19 MG/DL (ref 7–17)
CALCIUM SPEC-MCNC: 9.3 MG/DL (ref 8.4–10.2)
CHLORIDE SPEC-SCNC: 98 MMOL/L (ref 98–107)
CO2 SERPL-SCNC: 27 MMOL/L (ref 22–30)
CREAT BLD-SCNC: 0.9 MG/DL (ref 0.52–1.04)
ESTIMATED GLOMERULAR FILT RATE: 63 ML/MIN (ref 60–?)
GFR (AFRICAN AMERICAN): 76 ML/MIN (ref 60–?)
GLUCOSE: 92 MG/DL (ref 74–100)
POTASSIUM: 3.7 MMOL/L (ref 3.5–5.1)
SODIUM SPEC-SCNC: 138 MMOL/L (ref 136–145)

## 2021-03-17 PROCEDURE — 80048 BASIC METABOLIC PNL TOTAL CA: CPT

## 2021-03-17 PROCEDURE — 36415 COLL VENOUS BLD VENIPUNCTURE: CPT

## 2021-03-22 ENCOUNTER — HOSPITAL ENCOUNTER (OUTPATIENT)
Age: 66
End: 2021-03-22
Payer: COMMERCIAL

## 2021-03-22 DIAGNOSIS — E11.9: ICD-10-CM

## 2021-03-22 DIAGNOSIS — Z01.818: Primary | ICD-10-CM

## 2021-03-22 DIAGNOSIS — Z20.822: ICD-10-CM

## 2021-03-22 DIAGNOSIS — I35.0: ICD-10-CM

## 2021-03-22 DIAGNOSIS — I25.10: ICD-10-CM

## 2021-03-22 DIAGNOSIS — I65.23: ICD-10-CM

## 2021-03-22 DIAGNOSIS — I10: ICD-10-CM

## 2021-03-22 DIAGNOSIS — R06.02: ICD-10-CM

## 2021-03-22 DIAGNOSIS — R60.9: ICD-10-CM

## 2021-03-22 DIAGNOSIS — E78.2: ICD-10-CM

## 2021-03-22 LAB
ANION GAP SERPL CALC-SCNC: 16.8 MEQ/L (ref 5–15)
BUN SERPL-MCNC: 15 MG/DL (ref 7–17)
CALCIUM SPEC-MCNC: 9.6 MG/DL (ref 8.4–10.2)
CHLORIDE SPEC-SCNC: 100 MMOL/L (ref 98–107)
CO2 SERPL-SCNC: 26 MMOL/L (ref 22–30)
CREAT BLD-SCNC: 1 MG/DL (ref 0.52–1.04)
ESTIMATED GLOMERULAR FILT RATE: 56 ML/MIN (ref 60–?)
GFR (AFRICAN AMERICAN): 68 ML/MIN (ref 60–?)
GLUCOSE: 129 MG/DL (ref 74–100)
HCT VFR BLD CALC: 41.2 % (ref 37–47)
HGB BLD-MCNC: 12.9 G/DL (ref 12.2–16.2)
MCHC RBC-ENTMCNC: 31.2 G/DL (ref 31.8–35.4)
MCV RBC: 79.2 FL (ref 81–99)
MEAN CORPUSCULAR HEMOGLOBIN: 24.7 PG (ref 27–31.2)
PLATELET # BLD: 409 K/MM3 (ref 142–424)
POTASSIUM: 3.8 MMOL/L (ref 3.5–5.1)
RBC # BLD AUTO: 5.2 M/MM3 (ref 4.2–5.4)
SODIUM SPEC-SCNC: 139 MMOL/L (ref 136–145)
WBC # BLD AUTO: 9.2 K/MM3 (ref 4.8–10.8)

## 2021-03-22 PROCEDURE — 85025 COMPLETE CBC W/AUTO DIFF WBC: CPT

## 2021-03-22 PROCEDURE — 86328 IA NFCT AB SARSCOV2 COVID19: CPT

## 2021-03-22 PROCEDURE — 80048 BASIC METABOLIC PNL TOTAL CA: CPT

## 2021-03-22 PROCEDURE — 36415 COLL VENOUS BLD VENIPUNCTURE: CPT

## 2021-03-25 ENCOUNTER — HOSPITAL ENCOUNTER (OUTPATIENT)
Dept: HOSPITAL 22 - CATHLAB | Age: 66
Discharge: HOME | End: 2021-03-25
Payer: COMMERCIAL

## 2021-03-25 VITALS
SYSTOLIC BLOOD PRESSURE: 92 MMHG | RESPIRATION RATE: 18 BRPM | OXYGEN SATURATION: 98 % | HEART RATE: 67 BPM | DIASTOLIC BLOOD PRESSURE: 42 MMHG

## 2021-03-25 VITALS
SYSTOLIC BLOOD PRESSURE: 121 MMHG | HEART RATE: 72 BPM | RESPIRATION RATE: 18 BRPM | DIASTOLIC BLOOD PRESSURE: 45 MMHG | OXYGEN SATURATION: 98 %

## 2021-03-25 VITALS
SYSTOLIC BLOOD PRESSURE: 121 MMHG | DIASTOLIC BLOOD PRESSURE: 45 MMHG | RESPIRATION RATE: 18 BRPM | OXYGEN SATURATION: 98 % | HEART RATE: 73 BPM

## 2021-03-25 VITALS
DIASTOLIC BLOOD PRESSURE: 45 MMHG | HEART RATE: 69 BPM | OXYGEN SATURATION: 90 % | SYSTOLIC BLOOD PRESSURE: 106 MMHG | RESPIRATION RATE: 18 BRPM

## 2021-03-25 VITALS
SYSTOLIC BLOOD PRESSURE: 93 MMHG | RESPIRATION RATE: 20 BRPM | HEART RATE: 76 BPM | OXYGEN SATURATION: 96 % | DIASTOLIC BLOOD PRESSURE: 43 MMHG

## 2021-03-25 VITALS
SYSTOLIC BLOOD PRESSURE: 123 MMHG | OXYGEN SATURATION: 97 % | DIASTOLIC BLOOD PRESSURE: 53 MMHG | RESPIRATION RATE: 20 BRPM

## 2021-03-25 VITALS — BODY MASS INDEX: 56.3 KG/M2

## 2021-03-25 DIAGNOSIS — I35.0: Primary | ICD-10-CM

## 2021-03-25 PROCEDURE — 93312 ECHO TRANSESOPHAGEAL: CPT

## 2021-04-08 ENCOUNTER — HOSPITAL ENCOUNTER (OUTPATIENT)
Dept: CARDIOLOGY | Facility: HOSPITAL | Age: 66
Discharge: HOME | End: 2021-04-08

## 2021-04-08 DIAGNOSIS — Z00.6 ENCOUNTER FOR EXAMINATION FOR NORMAL COMPARISON OR CONTROL IN CLINICAL RESEARCH PROGRAM: ICD-10-CM

## 2021-04-09 ENCOUNTER — HOSPITAL ENCOUNTER (OUTPATIENT)
Dept: CARDIOLOGY | Facility: HOSPITAL | Age: 66
Discharge: HOME | End: 2021-04-09

## 2021-04-09 DIAGNOSIS — Z00.6 EXAMINATION FOR NORMAL COMPARISON OR CONTROL IN CLINICAL RESEARCH: ICD-10-CM

## 2021-04-12 ENCOUNTER — OFFICE VISIT (OUTPATIENT)
Dept: CARDIAC SURGERY | Facility: CLINIC | Age: 66
End: 2021-04-12

## 2021-04-12 VITALS
HEIGHT: 59 IN | DIASTOLIC BLOOD PRESSURE: 82 MMHG | BODY MASS INDEX: 56.53 KG/M2 | WEIGHT: 280.4 LBS | HEART RATE: 78 BPM | SYSTOLIC BLOOD PRESSURE: 140 MMHG | OXYGEN SATURATION: 98 % | TEMPERATURE: 97.3 F

## 2021-04-12 DIAGNOSIS — I35.9 AORTIC VALVE DISORDER: Primary | ICD-10-CM

## 2021-04-12 PROCEDURE — 99204 OFFICE O/P NEW MOD 45 MIN: CPT | Performed by: THORACIC SURGERY (CARDIOTHORACIC VASCULAR SURGERY)

## 2021-04-12 RX ORDER — ROPINIROLE 0.25 MG/1
0.25 TABLET, FILM COATED ORAL NIGHTLY
COMMUNITY

## 2021-04-12 RX ORDER — DULOXETIN HYDROCHLORIDE 60 MG/1
60 CAPSULE, DELAYED RELEASE ORAL DAILY
COMMUNITY

## 2021-04-12 RX ORDER — METOPROLOL SUCCINATE 25 MG/1
25 TABLET, EXTENDED RELEASE ORAL DAILY
COMMUNITY

## 2021-04-12 RX ORDER — ACETAMINOPHEN AND CODEINE PHOSPHATE 300; 30 MG/1; MG/1
1 TABLET ORAL EVERY 4 HOURS PRN
COMMUNITY

## 2021-04-12 RX ORDER — OXYBUTYNIN CHLORIDE 10 MG/1
10 TABLET, EXTENDED RELEASE ORAL DAILY
COMMUNITY

## 2021-04-12 RX ORDER — LEVOTHYROXINE SODIUM 0.07 MG/1
75 TABLET ORAL DAILY
COMMUNITY

## 2021-04-12 RX ORDER — OMEPRAZOLE 40 MG/1
40 CAPSULE, DELAYED RELEASE ORAL DAILY
COMMUNITY

## 2021-04-12 RX ORDER — LOSARTAN POTASSIUM 25 MG/1
25 TABLET ORAL DAILY
COMMUNITY

## 2021-04-12 RX ORDER — ATORVASTATIN CALCIUM 80 MG/1
80 TABLET, FILM COATED ORAL DAILY
COMMUNITY

## 2021-04-12 RX ORDER — SPIRONOLACTONE 25 MG/1
25 TABLET ORAL DAILY
COMMUNITY

## 2021-04-12 RX ORDER — METOCLOPRAMIDE 5 MG/1
5 TABLET ORAL
COMMUNITY

## 2021-04-12 RX ORDER — MONTELUKAST SODIUM 10 MG/1
10 TABLET ORAL NIGHTLY
COMMUNITY

## 2021-04-12 RX ORDER — FUROSEMIDE 40 MG/1
40 TABLET ORAL 2 TIMES DAILY
COMMUNITY

## 2021-04-12 RX ORDER — BENZONATATE 200 MG/1
200 CAPSULE ORAL 3 TIMES DAILY PRN
COMMUNITY

## 2021-04-12 RX ORDER — ASPIRIN 81 MG/1
81 TABLET, CHEWABLE ORAL DAILY
COMMUNITY

## 2021-04-12 RX ORDER — POTASSIUM CHLORIDE 750 MG/1
10 TABLET, FILM COATED, EXTENDED RELEASE ORAL 2 TIMES DAILY
COMMUNITY

## 2021-04-12 NOTE — PROGRESS NOTES
04/12/2021  Patient Information  Tiffani Bingham                                                                                          [address] [place] [address]   1955  'PCP/Referring Physician'  Mariano Christianson MD  [phone]  Vignesh Orosco MD  [phone]  Chief Complaint   Patient presents with   • Aortic Stenosis     New patient per Dr. Orosco for aortic valve stenosis       History of Present Illness:  The patient is a 65-year-old female who was referred for transcatheter aortic valve evaluation.  This patient complains of shortness of breath with almost any activity.  She is unable to walk 15 to 20 feet without significant shortness of breath.  She, also, has significant morbid obesity with a body mass index of 56. A cardiac catheterization recently, at an outside facility, demonstrated no significant coronary artery disease.  Transesophageal echo, at an outside facility, demonstrated an ejection fraction of 55% with a valve area of 0.9.  The valve was also noted to be bicuspid.  I do not have the valve velocities or gradients from that echo report.      Patient Active Problem List   Diagnosis   • Aortic valve disorder     Past Medical History:   Diagnosis Date   • Asthma    • Diabetes mellitus (CMS/HCC)    • Hyperlipidemia    • Hypertension    • Hyperthyroidism    • Stroke (CMS/HCC)      Past Surgical History:   Procedure Laterality Date   • APPENDECTOMY     • CHOLECYSTECTOMY     • COLONOSCOPY     • DILATION AND CURETTAGE, DIAGNOSTIC / THERAPEUTIC     • HYSTERECTOMY     • SKIN TAG REMOVAL         Current Outpatient Medications:   •  acetaminophen-codeine (TYLENOL #3) 300-30 MG per tablet, Take 1 tablet by mouth Every 4 (Four) Hours As Needed for Moderate Pain ., Disp: , Rfl:   •  aspirin 81 MG chewable tablet, Chew 81 mg Daily., Disp: , Rfl:   •  atorvastatin (LIPITOR) 80 MG tablet, Take 80 mg by mouth Daily., Disp: , Rfl:   •  benzonatate (TESSALON) 200 MG capsule, Take  200 mg by mouth 3 (Three) Times a Day As Needed for Cough., Disp: , Rfl:   •  DULoxetine (CYMBALTA) 60 MG capsule, Take 60 mg by mouth Daily., Disp: , Rfl:   •  fluticasone-salmeterol (ADVAIR) 500-50 MCG/DOSE DISKUS, Inhale 2 (Two) Times a Day., Disp: , Rfl:   •  furosemide (LASIX) 40 MG tablet, Take 40 mg by mouth 2 (Two) Times a Day., Disp: , Rfl:   •  levothyroxine (SYNTHROID, LEVOTHROID) 75 MCG tablet, Take 75 mcg by mouth Daily., Disp: , Rfl:   •  losartan (COZAAR) 25 MG tablet, Take 25 mg by mouth Daily., Disp: , Rfl:   •  metFORMIN (GLUCOPHAGE) 500 MG tablet, Take 500 mg by mouth 2 (Two) Times a Day With Meals., Disp: , Rfl:   •  metoclopramide (REGLAN) 5 MG tablet, Take 5 mg by mouth 4 (Four) Times a Day Before Meals & at Bedtime., Disp: , Rfl:   •  metoprolol succinate XL (TOPROL-XL) 25 MG 24 hr tablet, Take 25 mg by mouth Daily., Disp: , Rfl:   •  montelukast (SINGULAIR) 10 MG tablet, Take 10 mg by mouth Every Night., Disp: , Rfl:   •  omeprazole (priLOSEC) 40 MG capsule, Take 40 mg by mouth Daily., Disp: , Rfl:   •  oxybutynin XL (DITROPAN-XL) 10 MG 24 hr tablet, Take 10 mg by mouth Daily., Disp: , Rfl:   •  potassium chloride 10 MEQ CR tablet, Take 10 mEq by mouth 2 (Two) Times a Day., Disp: , Rfl:   •  rOPINIRole (REQUIP) 0.25 MG tablet, Take 0.25 mg by mouth Every Night. Take 1 hour before bedtime., Disp: , Rfl:   •  spironolactone (ALDACTONE) 25 MG tablet, Take 25 mg by mouth Daily., Disp: , Rfl:   Allergies   Allergen Reactions   • Nsaids Dizziness and Cough     Social History     Socioeconomic History   • Marital status:      Spouse name: Not on file   • Number of children: Not on file   • Years of education: Not on file   • Highest education level: Not on file   Tobacco Use   • Smoking status: Never Smoker   • Smokeless tobacco: Never Used   Substance and Sexual Activity   • Alcohol use: Not Currently   • Drug use: Never     Family History   Problem Relation Age of Onset   • Coronary artery  "disease Mother    • Asthma Mother    • Hypertension Mother    • Diabetes Mother    • Heart attack Father      Review of Systems   Constitutional: Negative for chills, fever, malaise/fatigue, night sweats and weight loss.   HENT: Negative for hearing loss, odynophagia and sore throat.    Cardiovascular: Positive for chest pain (\"pinching sensation in chest\"), dyspnea on exertion and palpitations. Negative for leg swelling and orthopnea.   Respiratory: Positive for cough. Negative for hemoptysis.    Endocrine: Negative for cold intolerance, heat intolerance, polydipsia, polyphagia and polyuria.   Hematologic/Lymphatic: Does not bruise/bleed easily.   Skin: Negative for itching and rash.   Musculoskeletal: Negative for joint pain, joint swelling and myalgias.   Gastrointestinal: Negative for abdominal pain, constipation, diarrhea, hematemesis, hematochezia, melena, nausea and vomiting.   Genitourinary: Negative for dysuria, frequency and hematuria.   Neurological: Positive for dizziness, light-headedness, numbness (in hands, arms, and right leg ) and paresthesias. Negative for focal weakness, headaches and seizures.   Psychiatric/Behavioral: Negative for suicidal ideas.   All other systems reviewed and are negative.    Vitals:    04/12/21 0901   BP: 140/82   BP Location: Left arm   Patient Position: Sitting   Pulse: 78   Temp: 97.3 °F (36.3 °C)   SpO2: 98%   Weight: 127 kg (280 lb 6.4 oz)   Height: 149.9 cm (59\")      Physical Exam   CONSTITUTIONAL: Alert and conversant, Well dressed, Well nourished, No acute distress severe morbid truncal obesity  EYES: Sclera clean, Anicteric, Pupils equal  ENT: No nasal deviation, Trachea midline  NECK: No neck masses, Supple  LUNGS: No wheezing, Cough, non-congested  HEART: No rubs, No murmurs  GASTROINTESTINAL: Soft, non-distended, No masses, Non tender  to palpation, normal bowel sounds  NEURO: No motor deficits, No sensory deficits, Cranial Nerves 2 through 12 grossly " intact  PSYCHIATRIC: Oriented to person, place and time, No memory deficits, Mood appropriate  VASCULAR: No carotid bruits, Femoral pulses palpable and symmetric  MUSKULOSKELETAL: No extremity trauma or extremity asymmetry    The ROS, past medical history, surgical history, family history, social history and vitals were reviewed by myself and corrected as needed.      Labs/Imaging:   I reviewed the cardiac catheterization images and part of an echo report.    Assessment/Plan:   The patient is a 65-year-old female who is being referred for transcatheter aortic valve evaluation.  This patient has severe shortness of breath and a bicuspid aortic valve.  The area across this valve is 0.9 cm².  Cardiac catheterization demonstrates no significant coronary disease.  The reports that I have at this time, I am unable to find the gradients across this valve.  We will try to obtain those reports.  This patient has significant truncal morbid obesity which will make femoral access extremely difficult.  I am also not convinced, without the valve data, that this patient's shortness of breath might also be related to her morbid obesity.  I have arranged for her to visit [facility] today and we will try to obtain the other tests.  She is very agreeable to proceed with surgery if we deem her a candidate and she is aware of the risks of stroke, bleeding, infection, death and possible need for pacemaker placement.  We will begin the work-up.    Patient Active Problem List   Diagnosis   • Aortic valve disorder       CC: MD Vignesh Medina MD Regina Fugate editing for Tom Louie MD      I, Tom Louie MD, have read and agree with the editing done by Tracey Ramon, .

## 2021-04-13 DIAGNOSIS — Z86.73 HISTORY OF CEREBROVASCULAR ACCIDENT (CVA) IN ADULTHOOD: ICD-10-CM

## 2021-04-13 DIAGNOSIS — I10 ESSENTIAL HYPERTENSION: ICD-10-CM

## 2021-04-13 DIAGNOSIS — E10.59 TYPE 1 DIABETES MELLITUS WITH OTHER CIRCULATORY COMPLICATION (HCC): ICD-10-CM

## 2021-04-13 DIAGNOSIS — E11.59 TYPE 2 DIABETES MELLITUS WITH OTHER CIRCULATORY COMPLICATION, WITHOUT LONG-TERM CURRENT USE OF INSULIN (HCC): ICD-10-CM

## 2021-04-13 DIAGNOSIS — I35.9 AORTIC VALVE DISORDER: Primary | ICD-10-CM

## 2021-04-13 DIAGNOSIS — R42 DIZZINESS: ICD-10-CM

## 2021-04-13 NOTE — PROGRESS NOTES
TAVR DOMINIQUE    Patient met with Dr. Louie in clinic on 4/12/21.  Met with patient briefly afterwards to outline remainder of TAVR pre-op evaluation.  See orders below for AURELIA, CTA TAVR protocol and carotid doppler.  Advised will try and get tests on same day if possible.  Will call patient with scheduling details once determined.    Debi FOX

## 2021-04-21 ENCOUNTER — TELEPHONE (OUTPATIENT)
Dept: CARDIOLOGY | Facility: HOSPITAL | Age: 66
End: 2021-04-21

## 2021-04-21 DIAGNOSIS — I35.9 AORTIC VALVE DISORDER: Primary | ICD-10-CM

## 2021-04-21 DIAGNOSIS — R06.09 DYSPNEA ON EXERTION: ICD-10-CM

## 2021-04-21 NOTE — PROGRESS NOTES
TAVR DOMINIQUE    Films from [facility] reviewed by TAVR interventional cardiology (Cathy).  Aortic valve appears bicuspid but no spectal data on the AURELIA to determine gradients.  Transthoracic echo @  [facility] recommended before proceeding with CTA or any repeat AUERLIA.      Will call patient with update/ appointment details.    Debi FOX

## 2021-04-21 NOTE — TELEPHONE ENCOUNTER
TAVR DOMINIQUE      Called patient w/ TAVR Cardiology review of studies from [facility].  Advised Dr. Morgan recommends transthoracic echo to evaluate AV gradients prior to proceeding with additional studies.  Scheduled for 5/4 @ 3 PM.  Please arrive 15-30 minutes early to register.  No prep.  No  needed.      Ms. Bingham read back to me the above information correctly.      Debi FOX

## 2021-04-23 ENCOUNTER — TELEPHONE (OUTPATIENT)
Dept: CARDIOLOGY | Facility: HOSPITAL | Age: 66
End: 2021-04-23

## 2021-04-29 ENCOUNTER — OFFICE VISIT (OUTPATIENT)
Dept: CARDIAC SURGERY | Facility: CLINIC | Age: 66
End: 2021-04-29

## 2021-04-29 VITALS
HEART RATE: 68 BPM | SYSTOLIC BLOOD PRESSURE: 132 MMHG | HEIGHT: 59 IN | OXYGEN SATURATION: 99 % | BODY MASS INDEX: 56.65 KG/M2 | TEMPERATURE: 97.7 F | WEIGHT: 281 LBS | DIASTOLIC BLOOD PRESSURE: 78 MMHG

## 2021-04-29 DIAGNOSIS — I35.9 AORTIC VALVE DISORDER: ICD-10-CM

## 2021-04-29 DIAGNOSIS — R06.09 DOE (DYSPNEA ON EXERTION): Primary | ICD-10-CM

## 2021-04-29 DIAGNOSIS — Z86.73 HISTORY OF CVA (CEREBROVASCULAR ACCIDENT) WITHOUT RESIDUAL DEFICITS: ICD-10-CM

## 2021-04-29 DIAGNOSIS — E66.01 MORBID OBESITY WITH BMI OF 50.0-59.9, ADULT (HCC): ICD-10-CM

## 2021-04-29 PROCEDURE — 99214 OFFICE O/P EST MOD 30 MIN: CPT | Performed by: STUDENT IN AN ORGANIZED HEALTH CARE EDUCATION/TRAINING PROGRAM

## 2021-04-29 NOTE — PROGRESS NOTES
Date: 2021   Patient Name: Tiffani Bingham  Address: [address] [place] [address]  [facility] [address]  : 1955   Phone #: [unfilled]   MRN: [id number]     Referring Physician: No ref. provider found    Chief Complaint:    Chief Complaint   Patient presents with   • Consult     TAVR Eval . Pt saw Dr. Louie on 21.       History of Present Illness: Tiffani Bingham is a 65 y.o. female who is here today to establish care with Cardiothoracic Surgery.  I am seeing her as a second opinion for TAVR.  She was seen by my partner about 2 weeks ago.  She is in the process of her TAVR work-up.  She is scheduled for a TTE next week followed by a potential AURELIA and CT angiogram.  She continues to have dyspnea on exertion and now she is anxious about her valve and thinks that she has chest pains.  Overall she has not had any changes since we saw her 3 weeks ago and her weight has remained the same.    Review of Systems:   Review of Systems   Constitutional: Positive for malaise/fatigue. Negative for chills, fever, night sweats and weight loss.   HENT: Negative for hearing loss, odynophagia and sore throat.    Cardiovascular: Positive for chest pain, dyspnea on exertion and leg swelling ( leg pain along with swelling). Negative for orthopnea and palpitations.   Respiratory: Positive for cough and shortness of breath.    Hematologic/Lymphatic: Does not bruise/bleed easily.   Skin: Negative for itching and rash.   Musculoskeletal: Positive for myalgias. Negative for arthritis, joint pain and joint swelling.   Gastrointestinal: Negative for abdominal pain, constipation, diarrhea, hematemesis, hematochezia, nausea and vomiting.   Genitourinary: Negative for dysuria, frequency and hematuria.   Neurological: Positive for dizziness and light-headedness. Negative for focal weakness, headaches, numbness and seizures.   Psychiatric/Behavioral: Negative for suicidal ideas.        Past Medical History:   Past Medical History:   Diagnosis  Date   • Asthma    • Diabetes mellitus (CMS/HCC)    • Hyperlipidemia    • Hypertension    • Hyperthyroidism    • Stroke (CMS/HCC)        Past Surgical History:   Past Surgical History:   Procedure Laterality Date   • APPENDECTOMY     • CHOLECYSTECTOMY     • COLONOSCOPY     • DILATION AND CURETTAGE, DIAGNOSTIC / THERAPEUTIC     • HYSTERECTOMY     • SKIN TAG REMOVAL         Family History:   Family History   Problem Relation Age of Onset   • Coronary artery disease Mother    • Asthma Mother    • Hypertension Mother    • Diabetes Mother    • Heart attack Father        Social History:   Social History     Socioeconomic History   • Marital status:      Spouse name: Not on file   • Number of children: 2   • Years of education: Not on file   • Highest education level: Not on file   Tobacco Use   • Smoking status: Never Smoker   • Smokeless tobacco: Never Used   Substance and Sexual Activity   • Alcohol use: Not Currently   • Drug use: Never       Medications:     Current Outpatient Medications:   •  acetaminophen-codeine (TYLENOL #3) 300-30 MG per tablet, Take 1 tablet by mouth Every 4 (Four) Hours As Needed for Moderate Pain ., Disp: , Rfl:   •  aspirin 81 MG chewable tablet, Chew 81 mg Daily., Disp: , Rfl:   •  atorvastatin (LIPITOR) 80 MG tablet, Take 80 mg by mouth Daily., Disp: , Rfl:   •  benzonatate (TESSALON) 200 MG capsule, Take 200 mg by mouth 3 (Three) Times a Day As Needed for Cough., Disp: , Rfl:   •  DULoxetine (CYMBALTA) 60 MG capsule, Take 60 mg by mouth Daily., Disp: , Rfl:   •  fluticasone-salmeterol (ADVAIR) 500-50 MCG/DOSE DISKUS, Inhale 2 (Two) Times a Day., Disp: , Rfl:   •  furosemide (LASIX) 40 MG tablet, Take 40 mg by mouth 2 (Two) Times a Day., Disp: , Rfl:   •  levothyroxine (SYNTHROID, LEVOTHROID) 75 MCG tablet, Take 75 mcg by mouth Daily., Disp: , Rfl:   •  losartan (COZAAR) 25 MG tablet, Take 25 mg by mouth Daily., Disp: , Rfl:   •  metFORMIN (GLUCOPHAGE) 500 MG tablet, Take 500 mg by  "mouth 2 (Two) Times a Day With Meals., Disp: , Rfl:   •  metoclopramide (REGLAN) 5 MG tablet, Take 5 mg by mouth 4 (Four) Times a Day Before Meals & at Bedtime., Disp: , Rfl:   •  metoprolol succinate XL (TOPROL-XL) 25 MG 24 hr tablet, Take 25 mg by mouth Daily., Disp: , Rfl:   •  montelukast (SINGULAIR) 10 MG tablet, Take 10 mg by mouth Every Night., Disp: , Rfl:   •  omeprazole (priLOSEC) 40 MG capsule, Take 40 mg by mouth Daily., Disp: , Rfl:   •  oxybutynin XL (DITROPAN-XL) 10 MG 24 hr tablet, Take 10 mg by mouth Daily., Disp: , Rfl:   •  potassium chloride 10 MEQ CR tablet, Take 10 mEq by mouth 2 (Two) Times a Day., Disp: , Rfl:   •  rOPINIRole (REQUIP) 0.25 MG tablet, Take 0.25 mg by mouth Every Night. Take 1 hour before bedtime., Disp: , Rfl:   •  spironolactone (ALDACTONE) 25 MG tablet, Take 25 mg by mouth Daily., Disp: , Rfl:     Allergies:   Allergies   Allergen Reactions   • Nsaids Dizziness and Cough       Physical Exam:  Vital Signs:   Vitals:    04/29/21 0854   BP: 132/78   BP Location: Left arm   Pulse: 68   Temp: 97.7 °F (36.5 °C)   SpO2: 99%   Weight: 127 kg (281 lb)   Height: 149.9 cm (59\")     Body mass index is 56.76 kg/m².     Physical Exam  Vitals and nursing note reviewed.   Constitutional:       Appearance: She is well-developed. She is obese. She is not toxic-appearing.   HENT:      Head: Normocephalic.   Eyes:      Conjunctiva/sclera: Conjunctivae normal.      Pupils: Pupils are equal, round, and reactive to light.   Neck:      Vascular: No carotid bruit or JVD.   Cardiovascular:      Rate and Rhythm: Normal rate and regular rhythm.      Pulses:           Carotid pulses are 2+ on the right side and 2+ on the left side.       Radial pulses are 2+ on the right side and 2+ on the left side.        Femoral pulses are 2+ on the right side and 2+ on the left side.       Popliteal pulses are 2+ on the right side and 2+ on the left side.        Dorsalis pedis pulses are 2+ on the right side and 2+ " on the left side.        Posterior tibial pulses are 2+ on the right side and 2+ on the left side.      Heart sounds: Murmur heard.   No systolic murmur is present.   No diastolic murmur is present.   No friction rub. No gallop.    Pulmonary:      Effort: Pulmonary effort is normal. No respiratory distress.      Breath sounds: Normal breath sounds. No wheezing, rhonchi or rales.   Chest:      Chest wall: No tenderness.   Abdominal:      General: Bowel sounds are normal. There is no distension.      Palpations: Abdomen is soft. There is no mass.      Tenderness: There is no abdominal tenderness. There is no guarding.   Musculoskeletal:         General: Normal range of motion.      Cervical back: Normal range of motion.   Lymphadenopathy:      Cervical: No cervical adenopathy.   Skin:     General: Skin is warm and dry.      Capillary Refill: Capillary refill takes less than 2 seconds.      Findings: No rash.   Neurological:      Mental Status: She is alert and oriented to person, place, and time.      Cranial Nerves: No cranial nerve deficit.   Psychiatric:         Speech: Speech normal.         Behavior: Behavior normal.         Thought Content: Thought content normal.         Judgment: Judgment normal.         Assessment/Plan:   A 65 y.o. female with the following:     Diagnoses and all orders for this visit:    1. SNELL (dyspnea on exertion) (Primary)    2. Morbid obesity with BMI of 50.0-59.9, adult (CMS/HCC)    3. Aortic valve disorder    4. History of CVA (cerebrovascular accident) without residual deficits      I reviewed her left heart cath, transesophageal echo from [facility].    1.  She has a bicuspid aortic valve with an area of 0.9 cm.  She has moderate gradients on a AURELIA obtained at [facility].  She has to undergo repeat testing next week including a TTE, possible AURELIA and TAVR CT protocol.  She is definitely symptomatic.  We discussed if she was not a candidate for TAVR due to anatomy her suitability  for open surgery.  2.  Morbid obesity-she gained 30 pounds during Covid.  We discussed how it is difficult with her aortic valve to lose weight but encouraged her.  Ultimately she would be an excellent candidate for gastric surgery however her aortic valve would likely need addressed first.  3.  She had a CVA in 2019 in [place].  No residual deficit      Follow Up:   Return in about 4 weeks (around 5/27/2021).    The ROS, past medical history, surgical history, family history, social history and vitals were reviewed by myself and corrected as needed.      Vamshi Manning MD  [facility] Cardiothoracic Surgery   Electronically signed by Vamshi Manning MD, 04/29/21, 8:55 AM EDT.

## 2021-05-04 ENCOUNTER — HOSPITAL ENCOUNTER (OUTPATIENT)
Dept: CARDIOLOGY | Facility: HOSPITAL | Age: 66
Discharge: HOME | End: 2021-05-04
Admitting: NURSE PRACTITIONER

## 2021-05-04 VITALS — WEIGHT: 281 LBS | BODY MASS INDEX: 56.65 KG/M2 | HEIGHT: 59 IN

## 2021-05-04 DIAGNOSIS — R06.09 DYSPNEA ON EXERTION: ICD-10-CM

## 2021-05-04 DIAGNOSIS — I35.9 AORTIC VALVE DISORDER: ICD-10-CM

## 2021-05-04 LAB
BH CV ECHO MEAS - AO MAX PG (FULL): 39.9 MMHG
BH CV ECHO MEAS - AO MAX PG: 49 MMHG
BH CV ECHO MEAS - AO MEAN PG (FULL): 23.6 MMHG
BH CV ECHO MEAS - AO MEAN PG: 35 MMHG
BH CV ECHO MEAS - AO V2 MAX: 350 CM/SEC
BH CV ECHO MEAS - AO V2 MEAN: 240.6 CM/SEC
BH CV ECHO MEAS - AO V2 VTI: 65.7 CM
BH CV ECHO MEAS - AVA(I,A): 1.5 CM^2
BH CV ECHO MEAS - AVA(I,D): 1.2 CM^2
BH CV ECHO MEAS - AVA(V,A): 1.2 CM^2
BH CV ECHO MEAS - AVA(V,D): 1.2 CM^2
BH CV ECHO MEAS - BSA(HAYCOCK): 2.4 M^2
BH CV ECHO MEAS - BSA: 2.1 M^2
BH CV ECHO MEAS - BZI_BMI: 56.8 KILOGRAMS/M^2
BH CV ECHO MEAS - BZI_METRIC_HEIGHT: 149.9 CM
BH CV ECHO MEAS - BZI_METRIC_WEIGHT: 127.5 KG
BH CV ECHO MEAS - LA DIMENSION: 3.1 CM
BH CV ECHO MEAS - LV MAX PG: 6.1 MMHG
BH CV ECHO MEAS - LV MEAN PG: 3.8 MMHG
BH CV ECHO MEAS - LV V1 MAX: 123.2 CM/SEC
BH CV ECHO MEAS - LV V1 MEAN: 92 CM/SEC
BH CV ECHO MEAS - LV V1 VTI: 29.8 CM
BH CV ECHO MEAS - LVOT AREA (M): 3.1 CM^2
BH CV ECHO MEAS - LVOT AREA: 3.3 CM^2
BH CV ECHO MEAS - LVOT DIAM: 2 CM
BH CV ECHO MEAS - MV A MAX VEL: 143.2 CM/SEC
BH CV ECHO MEAS - MV DEC SLOPE: 516.5 CM/SEC^2
BH CV ECHO MEAS - MV DEC TIME: 0.36 SEC
BH CV ECHO MEAS - MV E MAX VEL: 125 CM/SEC
BH CV ECHO MEAS - MV E/A: 0.87
BH CV ECHO MEAS - MV MAX PG: 10.7 MMHG
BH CV ECHO MEAS - MV MEAN PG: 4 MMHG
BH CV ECHO MEAS - MV P1/2T MAX VEL: 152 CM/SEC
BH CV ECHO MEAS - MV P1/2T: 86.2 MSEC
BH CV ECHO MEAS - MV V2 MAX: 163.7 CM/SEC
BH CV ECHO MEAS - MV V2 MEAN: 94.7 CM/SEC
BH CV ECHO MEAS - MV V2 VTI: 48 CM
BH CV ECHO MEAS - MVA P1/2T LCG: 1.4 CM^2
BH CV ECHO MEAS - MVA(P1/2T): 2.6 CM^2
BH CV ECHO MEAS - MVA(VTI): 2 CM^2
BH CV ECHO MEAS - SI(LVOT): 45.5 ML/M^2
BH CV ECHO MEAS - SV(LVOT): 96.9 ML
BH CV VAS BP RIGHT ARM: NORMAL MMHG
LV EF 2D ECHO EST: 75 %

## 2021-05-04 PROCEDURE — 93306 TTE W/DOPPLER COMPLETE: CPT | Performed by: INTERNAL MEDICINE

## 2021-05-04 PROCEDURE — 93306 TTE W/DOPPLER COMPLETE: CPT

## 2021-05-04 PROCEDURE — 25010000002 SULFUR HEXAFLUORIDE MICROSPH 60.7-25 MG RECONSTITUTED SUSPENSION: Performed by: NURSE PRACTITIONER

## 2021-05-04 RX ADMIN — SULFUR HEXAFLUORIDE 2 ML: KIT at 15:58

## 2021-05-06 ENCOUNTER — HOSPITAL ENCOUNTER (OUTPATIENT)
Dept: CARDIOLOGY | Facility: HOSPITAL | Age: 66
Discharge: HOME | End: 2021-05-06

## 2021-05-06 ENCOUNTER — HOSPITAL ENCOUNTER (OUTPATIENT)
Dept: CT IMAGING | Facility: HOSPITAL | Age: 66
Discharge: HOME | End: 2021-05-06

## 2021-05-06 VITALS
RESPIRATION RATE: 18 BRPM | SYSTOLIC BLOOD PRESSURE: 130 MMHG | HEART RATE: 74 BPM | TEMPERATURE: 98.3 F | OXYGEN SATURATION: 95 % | DIASTOLIC BLOOD PRESSURE: 64 MMHG

## 2021-05-06 VITALS — HEIGHT: 59 IN | WEIGHT: 281 LBS | BODY MASS INDEX: 56.65 KG/M2

## 2021-05-06 DIAGNOSIS — E11.59 TYPE 2 DIABETES MELLITUS WITH OTHER CIRCULATORY COMPLICATION, WITHOUT LONG-TERM CURRENT USE OF INSULIN (HCC): ICD-10-CM

## 2021-05-06 DIAGNOSIS — R42 DIZZINESS: ICD-10-CM

## 2021-05-06 DIAGNOSIS — I10 ESSENTIAL HYPERTENSION: ICD-10-CM

## 2021-05-06 DIAGNOSIS — Z86.73 HISTORY OF CEREBROVASCULAR ACCIDENT (CVA) IN ADULTHOOD: ICD-10-CM

## 2021-05-06 DIAGNOSIS — I35.9 AORTIC VALVE DISORDER: ICD-10-CM

## 2021-05-06 LAB
BH CV ECHO MEAS - BSA(HAYCOCK): 2.4 M^2
BH CV ECHO MEAS - BSA: 2.1 M^2
BH CV ECHO MEAS - BZI_BMI: 56.8 KILOGRAMS/M^2
BH CV ECHO MEAS - BZI_METRIC_HEIGHT: 149.9 CM
BH CV ECHO MEAS - BZI_METRIC_WEIGHT: 127.5 KG
BH CV XLRA MEAS LEFT DIST CCA EDV: 22.8 CM/SEC
BH CV XLRA MEAS LEFT DIST CCA PSV: 93.5 CM/SEC
BH CV XLRA MEAS LEFT DIST ICA EDV: 22.5 CM/SEC
BH CV XLRA MEAS LEFT DIST ICA PSV: 82.7 CM/SEC
BH CV XLRA MEAS LEFT ICA/CCA RATIO: 0.86
BH CV XLRA MEAS LEFT MID CCA EDV: 16.5 CM/SEC
BH CV XLRA MEAS LEFT MID CCA PSV: 100.6 CM/SEC
BH CV XLRA MEAS LEFT MID ICA EDV: 17.3 CM/SEC
BH CV XLRA MEAS LEFT MID ICA PSV: 63.9 CM/SEC
BH CV XLRA MEAS LEFT PROX CCA EDV: 18.9 CM/SEC
BH CV XLRA MEAS LEFT PROX CCA PSV: 143.8 CM/SEC
BH CV XLRA MEAS LEFT PROX ECA EDV: 14.1 CM/SEC
BH CV XLRA MEAS LEFT PROX ECA PSV: 73.1 CM/SEC
BH CV XLRA MEAS LEFT PROX ICA EDV: 27.5 CM/SEC
BH CV XLRA MEAS LEFT PROX ICA PSV: 86.4 CM/SEC
BH CV XLRA MEAS LEFT PROX SCLA EDV: 22.8 CM/SEC
BH CV XLRA MEAS LEFT PROX SCLA PSV: 105.3 CM/SEC
BH CV XLRA MEAS LEFT VERTEBRAL A EDV: 5.8 CM/SEC
BH CV XLRA MEAS LEFT VERTEBRAL A PSV: 25.6 CM/SEC
BH CV XLRA MEAS RIGHT DIST CCA EDV: 24.4 CM/SEC
BH CV XLRA MEAS RIGHT DIST CCA PSV: 85.6 CM/SEC
BH CV XLRA MEAS RIGHT DIST ICA EDV: 11.6 CM/SEC
BH CV XLRA MEAS RIGHT DIST ICA PSV: 54.2 CM/SEC
BH CV XLRA MEAS RIGHT ICA/CCA RATIO: 1.03
BH CV XLRA MEAS RIGHT MID CCA EDV: 17.3 CM/SEC
BH CV XLRA MEAS RIGHT MID CCA PSV: 104.5 CM/SEC
BH CV XLRA MEAS RIGHT MID ICA EDV: 21.3 CM/SEC
BH CV XLRA MEAS RIGHT MID ICA PSV: 71.9 CM/SEC
BH CV XLRA MEAS RIGHT PROX CCA EDV: 12.7 CM/SEC
BH CV XLRA MEAS RIGHT PROX CCA PSV: 74.7 CM/SEC
BH CV XLRA MEAS RIGHT PROX ECA EDV: 14.1 CM/SEC
BH CV XLRA MEAS RIGHT PROX ECA PSV: 110.8 CM/SEC
BH CV XLRA MEAS RIGHT PROX ICA EDV: 30.6 CM/SEC
BH CV XLRA MEAS RIGHT PROX ICA PSV: 107.6 CM/SEC
BH CV XLRA MEAS RIGHT PROX SCLA EDV: 13.4 CM/SEC
BH CV XLRA MEAS RIGHT PROX SCLA PSV: 126.5 CM/SEC
BH CV XLRA MEAS RIGHT VERTEBRAL A EDV: 14.1 CM/SEC
BH CV XLRA MEAS RIGHT VERTEBRAL A PSV: 53.4 CM/SEC
LEFT ARM BP: NORMAL MMHG
MAXIMAL PREDICTED HEART RATE: 156 BPM
RIGHT ARM BP: NORMAL MMHG
STRESS TARGET HR: 133 BPM

## 2021-05-06 PROCEDURE — 74174 CTA ABD&PLVS W/CONTRAST: CPT

## 2021-05-06 PROCEDURE — 82565 ASSAY OF CREATININE: CPT

## 2021-05-06 PROCEDURE — 0 IOPAMIDOL PER 1 ML: Performed by: NURSE PRACTITIONER

## 2021-05-06 PROCEDURE — 71275 CT ANGIOGRAPHY CHEST: CPT

## 2021-05-06 PROCEDURE — 99204 OFFICE O/P NEW MOD 45 MIN: CPT | Performed by: NURSE PRACTITIONER

## 2021-05-06 PROCEDURE — 93880 EXTRACRANIAL BILAT STUDY: CPT

## 2021-05-06 PROCEDURE — 93880 EXTRACRANIAL BILAT STUDY: CPT | Performed by: INTERNAL MEDICINE

## 2021-05-06 RX ORDER — SODIUM CHLORIDE 0.9 % (FLUSH) 0.9 %
3 SYRINGE (ML) INJECTION EVERY 12 HOURS SCHEDULED
Status: CANCELLED | OUTPATIENT
Start: 2021-05-06

## 2021-05-06 RX ORDER — NITROGLYCERIN 0.4 MG/1
0.4 TABLET SUBLINGUAL
Status: CANCELLED | OUTPATIENT
Start: 2021-05-06 | End: 2021-05-06

## 2021-05-06 RX ORDER — NITROGLYCERIN 0.4 MG/1
0.4 TABLET SUBLINGUAL
Status: DISCONTINUED | OUTPATIENT
Start: 2021-05-06 | End: 2021-05-07 | Stop reason: HOSPADM

## 2021-05-06 RX ORDER — METOPROLOL TARTRATE 50 MG/1
100 TABLET, FILM COATED ORAL ONCE AS NEEDED
Status: CANCELLED | OUTPATIENT
Start: 2021-05-06

## 2021-05-06 RX ORDER — METOPROLOL TARTRATE 50 MG/1
100 TABLET, FILM COATED ORAL ONCE AS NEEDED
Status: DISCONTINUED | OUTPATIENT
Start: 2021-05-06 | End: 2021-05-07 | Stop reason: HOSPADM

## 2021-05-06 RX ORDER — METOPROLOL TARTRATE 50 MG/1
50 TABLET, FILM COATED ORAL ONCE AS NEEDED
Status: CANCELLED | OUTPATIENT
Start: 2021-05-06

## 2021-05-06 RX ORDER — LIDOCAINE HYDROCHLORIDE 10 MG/ML
5 INJECTION, SOLUTION EPIDURAL; INFILTRATION; INTRACAUDAL; PERINEURAL AS NEEDED
Status: CANCELLED | OUTPATIENT
Start: 2021-05-06

## 2021-05-06 RX ORDER — LIDOCAINE HYDROCHLORIDE 10 MG/ML
5 INJECTION, SOLUTION EPIDURAL; INFILTRATION; INTRACAUDAL; PERINEURAL AS NEEDED
Status: DISCONTINUED | OUTPATIENT
Start: 2021-05-06 | End: 2021-05-07 | Stop reason: HOSPADM

## 2021-05-06 RX ORDER — SODIUM CHLORIDE 0.9 % (FLUSH) 0.9 %
10 SYRINGE (ML) INJECTION AS NEEDED
Status: CANCELLED | OUTPATIENT
Start: 2021-05-06

## 2021-05-06 RX ORDER — METOPROLOL TARTRATE 50 MG/1
50 TABLET, FILM COATED ORAL ONCE AS NEEDED
Status: DISCONTINUED | OUTPATIENT
Start: 2021-05-06 | End: 2021-05-07 | Stop reason: HOSPADM

## 2021-05-06 RX ADMIN — IOPAMIDOL 95 ML: 755 INJECTION, SOLUTION INTRAVENOUS at 14:17

## 2021-05-06 NOTE — PROGRESS NOTES
TAVR APRN Evaluation    Tiffani Bingham, 1955, [id number]     05/06/21    PCP: Mariano Christianson MD  Primary Cardiologist: IRVING Haq MD    TAVR Team:  1.  Brody Og MD  2.  Edi Villalba MD  3.  Awa Morgan MD  4.  Kishan Swenson MD  5.  Vamshi Mario MD  6.  Tom Louie MD    Chief Complaint: Bicuspid aortic stenosis/ DHF      Identification: This is a 65 y.o. year old female from [address] [place] [address].    History of Present Illness: Ms. Bingham was referred for consideration of TAVR due to worsening symptoms of aortic valve stenosis including SNELL, chest pain and reduced exercise tolerance ongoing over the past 6-12 months.  Her aortic valve indices are as follows:  SARAH 0.9 cm2 per AURELIA on 3/25/21.  TTE 5/4/ 21 measured SARAH 1.2 cm2 with AV mean 35 mm Hg and Vmax 3.5 m/sec.  The TTE report from 5/4/21 notes LV peak gradient 4.2 m/sec with peak gradient 70 mm Hg.     Patient here today for CTA TAVR protocol and carotid doppler.  Kate Manning and Cathy plan to review TTE and decide if re-do AURELIA is needed before decision is made in regards to TAVR.      Problem List:   1.  CAD   A.  Non-obstructive disease:  LAD stenosis 10%,   Circumflex normal, RCA 20% stenosis  2.  Diastolic heart failure   A.  NYHA class III (KCCQ12 score 26/70)   B.  LVEDP 25 mm Hg  3.  Bicuspid aortic valve   A.  SARAH 0.9 cm per AURELIA 3/21/21   B.  Moderate to severe stenosis per TTE 5/4/21 as   noted above in HPI  4.  CVA 12/2019   A.  Presented as vertigo upon awakening with left leg   Weakness; Transfer from [facility] to [facility]  5.  DARYA   A.  BiPap + O2  6.  Asthma  7.  Pulmonary HTN  8.  Type 2 DM  9.  Dyslipidemia  10.  Morbid obesity   A.  BMI 56.7          Patient Active Problem List   Diagnosis   • Aortic valve disorder   • Morbid obesity with BMI of 50.0-59.9, adult (CMS/HCC)   • History of CVA (cerebrovascular accident) without residual deficits   • SNELL (dyspnea on exertion)       Past  Surgical History:  Past Surgical History:   Procedure Laterality Date   • APPENDECTOMY      age 17   • CHOLECYSTECTOMY  2000   • COLONOSCOPY     • DILATION AND CURETTAGE, DIAGNOSTIC / THERAPEUTIC     • HYSTERECTOMY  2001   • SKIN TAG REMOVAL         Allergies:  Nsaids and Nickel    Social History:  Social History     Socioeconomic History   • Marital status:      Spouse name: Not on file   • Number of children: 2   • Years of education: HS   • Highest education level: Not on file   Tobacco Use   • Smoking status: Never Smoker   • Smokeless tobacco: Never Used   Substance and Sexual Activity   • Alcohol use: Not Currently   • Drug use: Never       Current Medications:    Current Outpatient Medications:   •  acetaminophen-codeine (TYLENOL #3) 300-30 MG per tablet, Take 1 tablet by mouth Every 4 (Four) Hours As Needed for Moderate Pain ., Disp: , Rfl:   •  aspirin 81 MG chewable tablet, Chew 81 mg Daily., Disp: , Rfl:   •  atorvastatin (LIPITOR) 80 MG tablet, Take 80 mg by mouth Daily., Disp: , Rfl:   •  DULoxetine (CYMBALTA) 60 MG capsule, Take 60 mg by mouth Daily., Disp: , Rfl:   •  furosemide (LASIX) 40 MG tablet, Take 40 mg by mouth 2 (Two) Times a Day., Disp: , Rfl:   •  levothyroxine (SYNTHROID, LEVOTHROID) 75 MCG tablet, Take 75 mcg by mouth Daily., Disp: , Rfl:   •  losartan (COZAAR) 25 MG tablet, Take 25 mg by mouth Daily., Disp: , Rfl:   •  metFORMIN (GLUCOPHAGE) 500 MG tablet, Take 500 mg by mouth 2 (Two) Times a Day With Meals., Disp: , Rfl:   •  metoclopramide (REGLAN) 5 MG tablet, Take 5 mg by mouth 4 (Four) Times a Day Before Meals & at Bedtime., Disp: , Rfl:   •  metoprolol succinate XL (TOPROL-XL) 25 MG 24 hr tablet, Take 25 mg by mouth Daily., Disp: , Rfl:   •  montelukast (SINGULAIR) 10 MG tablet, Take 10 mg by mouth Every Night., Disp: , Rfl:   •  omeprazole (priLOSEC) 40 MG capsule, Take 40 mg by mouth Daily., Disp: , Rfl:   •  oxybutynin XL (DITROPAN-XL) 10 MG 24 hr tablet, Take 10 mg by  mouth Daily., Disp: , Rfl:   •  potassium chloride 10 MEQ CR tablet, Take 10 mEq by mouth 2 (Two) Times a Day., Disp: , Rfl:   •  rOPINIRole (REQUIP) 0.25 MG tablet, Take 0.25 mg by mouth Every Night. Take 1 hour before bedtime., Disp: , Rfl:   •  benzonatate (TESSALON) 200 MG capsule, Take 200 mg by mouth 3 (Three) Times a Day As Needed for Cough., Disp: , Rfl:   •  fluticasone-salmeterol (ADVAIR) 500-50 MCG/DOSE DISKUS, Inhale 2 (Two) Times a Day., Disp: , Rfl:   •  spironolactone (ALDACTONE) 25 MG tablet, Take 25 mg by mouth Daily., Disp: , Rfl:     Current Facility-Administered Medications:   •  lidocaine PF 1% (XYLOCAINE) injection 5 mL, 5 mL, Intradermal, PRN, Jer Mensah C, DO  •  metoprolol tartrate (LOPRESSOR) injection 5 mg, 5 mg, Intravenous, Q5 Min PRN, Jer Mensah, DO  •  metoprolol tartrate (LOPRESSOR) tablet 100 mg, 100 mg, Oral, Once PRN **OR** metoprolol tartrate (LOPRESSOR) tablet 50 mg, 50 mg, Oral, Once PRN, Jer Mensah, DO  •  nitroglycerin (NITROSTAT) SL tablet 0.4 mg, 0.4 mg, Sublingual, Once in imaging, Jer Mensah C, DO    Review of Systems:  Review of Systems   Constitutional: Positive for malaise/fatigue and weight gain.   HENT: Positive for congestion.    Eyes: Negative.    Cardiovascular: Positive for chest pain, dyspnea on exertion, leg swelling and near-syncope. Negative for irregular heartbeat, orthopnea, palpitations, paroxysmal nocturnal dyspnea and syncope.   Endocrine: Negative.    Hematologic/Lymphatic: Negative.    Musculoskeletal: Positive for arthritis and falls.   Gastrointestinal: Negative.    Genitourinary: Negative.    Neurological: Positive for light-headedness and loss of balance.   Psychiatric/Behavioral: Negative.    Allergic/Immunologic: Negative.         Physical Exam:  Vitals reviewed.   Constitutional:       Appearance: Not in distress.      Comments: Morbidly obese middle age female in NAD   Eyes:      Conjunctiva/sclera: Conjunctivae normal.       Pupils: Pupils are equal, round, and reactive to light.   Neck:      Thyroid: No thyromegaly.      Lymphadenopathy: No cervical adenopathy.   Pulmonary:      Effort: Pulmonary effort is normal.      Breath sounds: Normal breath sounds. No wheezing. No rhonchi. No rales.   Cardiovascular:      PMI at left midclavicular line. Normal rate. Regular rhythm.      Murmurs: There is a grade 3/6 mid frequency harsh, scratchy mid to late systolic murmur at the ULSB, radiating to the neck.      No click. No rub.   Pulses:     Intact distal pulses.   Edema:     Peripheral edema absent.   Abdominal:      General: Bowel sounds are normal.      Palpations: Abdomen is soft.   Musculoskeletal: Normal range of motion.         General: No deformity.      Extremities: No clubbing present.Skin:     General: Skin is warm and dry.   Neurological:      Mental Status: Alert and oriented to person, place and time.         Vitals:    05/06/21 1200   BP: 115/58   BP Location: Right arm   Pulse: 66   Resp: 18   Temp: 98.3 °F (36.8 °C)   TempSrc: Tympanic   SpO2: 95%       Diagnostic Data:  Transthoracic echo: 5/4/21    Left Ventricle Left ventricle not well visualized. Estimated left ventricular EF = 75% Left ventricular systolic function is hyperdynamic (EF > 70%).   Left ventricle not well visualized. Gradient through the LV is 4.24m with Valsalva.   Right Ventricle Right ventricle not well visualized.   Left Atrium Normal left atrial cavity size noted.   Right Atrium Normal right atrial cavity size noted.   Aortic Valve The aortic valve is abnormal in structure. The aortic valve exhibits sclerosis. A bicuspid aortic valve. No significant aortic valve regurgitation is present. Moderate aortic valve stenosis is present. Aortic valve mean pressure gradient is 35 mmHg. Aortic valve area is 1.2 cm2.   Mitral Valve Mitral valve is not well visualized. Moderate mitral annular calcification is present. Trace mitral valve regurgitation is present. No  significant mitral valve stenosis is present. Color Doppler suggests some degree of mitral stenosis, however no significant velocities were detected. TDS.   Tricuspid Valve Tricuspid valve not well visualized. Trace tricuspid valve regurgitation is present. No evidence of significant tricuspid valve stenosis is present.   Pulmonic Valve The pulmonic valve is not well visualized. The pulmonic valve was not assessed.   Greater Vessels No dilation of the aortic root is present. No dilation of the sinuses of Valsalva is present.   Pericardium The pericardium is normal. There is no evidence of pericardial effusion. .     Transesophageal echo: 3/25/21 @ [facility] LVEF 55-60%  Bicuspid valve, SARAH 0.9 cm2, No AS    Cardiac Cath: 3/1/21 @ [facility] with no flow limiting CAD.  Mderate to severe AS wth 45 mm transaortic valve gradient, moderate PHT and moderate elevation LVEDP 25 mm Hg     CTA Chest, Abdomen, and Pelvis: Today    Carotid Doppler: today      Functional Assessment Data:    KCCQ12 Questionnaire Score: (see scanned copy):26/70      [place] Basic Activities of Daily Living (ADL) Scale    Bathing (sponge bath, tub bath, or shower).  Receives either no assistance,   or assistance with bathing only on body part.   Yes    Dressing Gets clothes and dresses without any assistance, except for  tying shoes.        Yes    Toileting Goes to toilet room, uses toilet, arranges clothes, and returns  without any assistance (may use cane or walker for support and may use  bedpan / urinal at night.      Yes    Transferring Moves into and out of bed and chair without assistance  (may use cane or walker)      Yes    Continence Controls bowel and bladder completely without occasional  accidents        Yes    Feeding Feeds self without assistance (except for help with cutting meat  or buttering bread)       Yes        Total (Number of Yesses of 6) 6/6      [place]audencio[place]Josu[place] Instrumental Activities of Daily Living Scale (IADL)    Ability to Use Telephone    · Operates telephone on own initiative.  Looks up and dials numbers, etc.         1  · Shopping  Shops independently for small purchases  0     · Food Preparation  · Heats, serves and prepares meals, but does not maintain adequate diet.          0  · Housekeeping  · Performs light daily tasks such as dish washing, bed making          1  Laundry  Does personal laundry completely   1     Mode of Transportation  · Travels independently on public transportation or drives own car          1  Responsibility for Own Medications  · Is responsible for taking medications in correct dosages at correct time          1  Ability to Handle Finances  · Manages financial matters independently (budgets, writes checks,   pays rent, bills, goes to bank), collects and keeps track of income          1     Total (Number of Instrumental Activities of 8) 6/8         Five Meter Walk Test    Utilized Walking Aid? No     Walk 1: 7.18 s/5m     Walk 2: 8.6 s/5m     Walk 3: 8.34 s/5m    Five Meter Walk Average: 8.04 s/5m    Gait Speed: Slow (Average > 6 s/5m)     STS risk of mortality with open AVR: 2.85%  http://riskcalc.sts.org/stswebriskcalc/calculate       Creatinine Clearance: 106 ml/min  https://reference.[employer]/calculator/creatinine-clearance-cockcroft-gault    Assessment/ Plan:       1. Aortic valve disorder. Bicuspid stenosis.  Ongoing evaluation for TAVR per Dr. Manning/ Dr. Morgan.  Intermediate risk middle age female with morbid obesity which would hamper recovery from open AVR.   2. History of cerebrovascular accident (CVA) 2019   3. Essential hypertension    4. Type 2 diabetes mellitus with other circulatory complication, without long-term current use of insulin (CMS/[facility])    5. DHF related to valvular disease.  Very symptomatic NYHA Class III   6. DARYA.  Treated with BiPap + O2   7. Morbid Obesity.  BMI 56.7       TAVR education materials reviewed with patient today.  This included printed brochure detailing  pathophysiology of aortic stenosis, patient specific aortic stenosis symptoms, and treatment options (medical therapy, surgical aortic valve replacement, and transcatheter aortic valve replacement).  A model of the valve and procedural animation were also used to explain TAVR.  We discussed patient's goals of care:  Avoid dying from aortic stenosis and regain functional status.  We reviewed the [facility] Cardiosmart Shared Decision Making Tool for treatment of Aortic Stenosis to again compare/contrast the options of TAVR/ SAVR/ medical management.  Patient does not wish to pursue medical management and states she would definitely choose TAVR over SAVR provided her studies are reviewed as acceptable per Dr. Manning/ Cathy.    Our talk also included procedural details, procedure risks, anticipated pre-op and post-op expectations, as well as follow up visit schedule @ one month and one year.  Further discussion and final decision making will occur with Multi- Disciplinary Heart Team following the completion of today's pre-requisite testing.      Debi Galloway, DOMINIQUE, 05/06/21, 14:34 EDT

## 2021-05-09 LAB — CREAT BLDA-MCNC: 0.8 MG/DL (ref 0.6–1.3)

## 2021-05-12 ENCOUNTER — TELEPHONE (OUTPATIENT)
Dept: CARDIOLOGY | Facility: HOSPITAL | Age: 66
End: 2021-05-12

## 2021-05-19 ENCOUNTER — TELEPHONE (OUTPATIENT)
Dept: CARDIOLOGY | Facility: HOSPITAL | Age: 66
End: 2021-05-19

## 2021-05-19 DIAGNOSIS — I35.9 AORTIC VALVE DISORDER: Primary | ICD-10-CM

## 2021-05-19 NOTE — TELEPHONE ENCOUNTER
TAVR DOMINIQUE    Per TTE review by Dr. Manning/Cathy, Aortic stenosis is moderate.  TTE follow up in 6 months is recommended.  Attempted to call patient both numbers listed.  LM for her to please call [phone].    Debi FOX    Ms. Bingham returned my call today.  Advised of the message above.  Once central scheduling sets up the echo in 6 months, we will get her on Dr. Manning's clinic schedule to review the study and make further treatment plans. She is to call me once Central Scheduling sets up the echo.    Debi FOX

## 2021-07-26 ENCOUNTER — HOSPITAL ENCOUNTER (OUTPATIENT)
Age: 66
End: 2021-07-26
Payer: COMMERCIAL

## 2021-07-26 DIAGNOSIS — I10: ICD-10-CM

## 2021-07-26 DIAGNOSIS — G47.33: ICD-10-CM

## 2021-07-26 DIAGNOSIS — I20.8: ICD-10-CM

## 2021-07-26 DIAGNOSIS — I35.0: ICD-10-CM

## 2021-07-26 DIAGNOSIS — R60.9: ICD-10-CM

## 2021-07-26 DIAGNOSIS — Z01.810: Primary | ICD-10-CM

## 2021-07-26 DIAGNOSIS — Z82.49: ICD-10-CM

## 2021-07-26 DIAGNOSIS — I35.8: ICD-10-CM

## 2021-07-26 DIAGNOSIS — R06.02: ICD-10-CM

## 2021-07-26 DIAGNOSIS — I65.23: ICD-10-CM

## 2021-07-26 DIAGNOSIS — E78.2: ICD-10-CM

## 2021-07-26 DIAGNOSIS — Z86.73: ICD-10-CM

## 2021-07-26 DIAGNOSIS — R00.2: ICD-10-CM

## 2021-07-26 DIAGNOSIS — I27.20: ICD-10-CM

## 2021-07-26 DIAGNOSIS — Z79.84: ICD-10-CM

## 2021-07-26 DIAGNOSIS — Z11.52: ICD-10-CM

## 2021-07-26 DIAGNOSIS — E11.9: ICD-10-CM

## 2021-07-26 LAB
ANION GAP SERPL CALC-SCNC: 12.8 MEQ/L (ref 5–15)
BUN SERPL-MCNC: 14 MG/DL (ref 7–17)
CALCIUM SPEC-MCNC: 8.9 MG/DL (ref 8.4–10.2)
CHLORIDE SPEC-SCNC: 106 MMOL/L (ref 98–107)
CO2 SERPL-SCNC: 22 MMOL/L (ref 22–30)
CREAT BLD-SCNC: 1 MG/DL (ref 0.52–1.04)
ESTIMATED GLOMERULAR FILT RATE: 56 ML/MIN (ref 60–?)
GFR (AFRICAN AMERICAN): 67 ML/MIN (ref 60–?)
GLUCOSE: 129 MG/DL (ref 74–100)
HCT VFR BLD CALC: 36.1 % (ref 37–47)
HGB BLD-MCNC: 10.6 G/DL (ref 12.2–16.2)
MCHC RBC-ENTMCNC: 29.4 G/DL (ref 31.8–35.4)
MCV RBC: 76.4 FL (ref 81–99)
MEAN CORPUSCULAR HEMOGLOBIN: 22.5 PG (ref 27–31.2)
PLATELET # BLD: 390 K/MM3 (ref 142–424)
POTASSIUM: 4.8 MMOL/L (ref 3.5–5.1)
RBC # BLD AUTO: 4.72 M/MM3 (ref 4.2–5.4)
SODIUM SPEC-SCNC: 136 MMOL/L (ref 136–145)
WBC # BLD AUTO: 8.5 K/MM3 (ref 4.8–10.8)

## 2021-07-26 PROCEDURE — 36415 COLL VENOUS BLD VENIPUNCTURE: CPT

## 2021-07-26 PROCEDURE — U0003 INFECTIOUS AGENT DETECTION BY NUCLEIC ACID (DNA OR RNA); SEVERE ACUTE RESPIRATORY SYNDROME CORONAVIRUS 2 (SARS-COV-2) (CORONAVIRUS DISEASE [COVID-19]), AMPLIFIED PROBE TECHNIQUE, MAKING USE OF HIGH THROUGHPUT TECHNOLOGIES AS DESCRIBED BY CMS-2020-01-R: HCPCS

## 2021-07-26 PROCEDURE — 80048 BASIC METABOLIC PNL TOTAL CA: CPT

## 2021-07-26 PROCEDURE — 85025 COMPLETE CBC W/AUTO DIFF WBC: CPT

## 2021-07-29 ENCOUNTER — HOSPITAL ENCOUNTER (OUTPATIENT)
Dept: HOSPITAL 22 - CATHLAB | Age: 66
Discharge: HOME | End: 2021-07-29
Payer: COMMERCIAL

## 2021-07-29 VITALS
RESPIRATION RATE: 18 BRPM | OXYGEN SATURATION: 94 % | DIASTOLIC BLOOD PRESSURE: 68 MMHG | HEART RATE: 67 BPM | SYSTOLIC BLOOD PRESSURE: 121 MMHG

## 2021-07-29 VITALS
SYSTOLIC BLOOD PRESSURE: 130 MMHG | OXYGEN SATURATION: 93 % | RESPIRATION RATE: 18 BRPM | DIASTOLIC BLOOD PRESSURE: 65 MMHG | HEART RATE: 81 BPM

## 2021-07-29 VITALS
DIASTOLIC BLOOD PRESSURE: 75 MMHG | SYSTOLIC BLOOD PRESSURE: 117 MMHG | HEART RATE: 69 BPM | RESPIRATION RATE: 18 BRPM | OXYGEN SATURATION: 94 %

## 2021-07-29 VITALS
OXYGEN SATURATION: 99 % | DIASTOLIC BLOOD PRESSURE: 64 MMHG | RESPIRATION RATE: 18 BRPM | SYSTOLIC BLOOD PRESSURE: 93 MMHG | HEART RATE: 82 BPM

## 2021-07-29 VITALS
OXYGEN SATURATION: 93 % | RESPIRATION RATE: 18 BRPM | DIASTOLIC BLOOD PRESSURE: 81 MMHG | SYSTOLIC BLOOD PRESSURE: 123 MMHG | HEART RATE: 63 BPM

## 2021-07-29 VITALS
HEART RATE: 73 BPM | OXYGEN SATURATION: 94 % | DIASTOLIC BLOOD PRESSURE: 66 MMHG | RESPIRATION RATE: 18 BRPM | SYSTOLIC BLOOD PRESSURE: 117 MMHG

## 2021-07-29 VITALS
SYSTOLIC BLOOD PRESSURE: 110 MMHG | HEART RATE: 69 BPM | DIASTOLIC BLOOD PRESSURE: 66 MMHG | OXYGEN SATURATION: 98 % | RESPIRATION RATE: 18 BRPM

## 2021-07-29 VITALS — BODY MASS INDEX: 57.5 KG/M2

## 2021-07-29 VITALS
DIASTOLIC BLOOD PRESSURE: 62 MMHG | HEART RATE: 69 BPM | OXYGEN SATURATION: 94 % | SYSTOLIC BLOOD PRESSURE: 104 MMHG | RESPIRATION RATE: 18 BRPM

## 2021-07-29 VITALS
RESPIRATION RATE: 18 BRPM | SYSTOLIC BLOOD PRESSURE: 113 MMHG | DIASTOLIC BLOOD PRESSURE: 66 MMHG | HEART RATE: 69 BPM | OXYGEN SATURATION: 95 %

## 2021-07-29 VITALS
HEART RATE: 67 BPM | RESPIRATION RATE: 18 BRPM | OXYGEN SATURATION: 96 % | DIASTOLIC BLOOD PRESSURE: 66 MMHG | SYSTOLIC BLOOD PRESSURE: 124 MMHG

## 2021-07-29 VITALS
HEART RATE: 71 BPM | DIASTOLIC BLOOD PRESSURE: 66 MMHG | SYSTOLIC BLOOD PRESSURE: 114 MMHG | RESPIRATION RATE: 18 BRPM | OXYGEN SATURATION: 94 %

## 2021-07-29 VITALS
DIASTOLIC BLOOD PRESSURE: 66 MMHG | HEART RATE: 70 BPM | OXYGEN SATURATION: 94 % | RESPIRATION RATE: 18 BRPM | SYSTOLIC BLOOD PRESSURE: 115 MMHG

## 2021-07-29 VITALS
OXYGEN SATURATION: 96 % | HEART RATE: 67 BPM | SYSTOLIC BLOOD PRESSURE: 107 MMHG | DIASTOLIC BLOOD PRESSURE: 69 MMHG | RESPIRATION RATE: 18 BRPM

## 2021-07-29 VITALS
RESPIRATION RATE: 18 BRPM | HEART RATE: 68 BPM | SYSTOLIC BLOOD PRESSURE: 103 MMHG | DIASTOLIC BLOOD PRESSURE: 67 MMHG | OXYGEN SATURATION: 95 %

## 2021-07-29 VITALS
HEART RATE: 87 BPM | SYSTOLIC BLOOD PRESSURE: 92 MMHG | OXYGEN SATURATION: 98 % | RESPIRATION RATE: 18 BRPM | DIASTOLIC BLOOD PRESSURE: 72 MMHG

## 2021-07-29 VITALS
OXYGEN SATURATION: 92 % | HEART RATE: 73 BPM | RESPIRATION RATE: 18 BRPM | DIASTOLIC BLOOD PRESSURE: 66 MMHG | SYSTOLIC BLOOD PRESSURE: 117 MMHG

## 2021-07-29 VITALS
SYSTOLIC BLOOD PRESSURE: 99 MMHG | RESPIRATION RATE: 18 BRPM | HEART RATE: 72 BPM | OXYGEN SATURATION: 91 % | DIASTOLIC BLOOD PRESSURE: 61 MMHG

## 2021-07-29 VITALS
OXYGEN SATURATION: 93 % | RESPIRATION RATE: 18 BRPM | DIASTOLIC BLOOD PRESSURE: 65 MMHG | SYSTOLIC BLOOD PRESSURE: 111 MMHG | HEART RATE: 67 BPM

## 2021-07-29 VITALS
HEART RATE: 67 BPM | OXYGEN SATURATION: 95 % | SYSTOLIC BLOOD PRESSURE: 123 MMHG | RESPIRATION RATE: 18 BRPM | DIASTOLIC BLOOD PRESSURE: 66 MMHG

## 2021-07-29 DIAGNOSIS — Z20.822: ICD-10-CM

## 2021-07-29 DIAGNOSIS — I11.0: ICD-10-CM

## 2021-07-29 DIAGNOSIS — I50.82: Primary | ICD-10-CM

## 2021-07-29 DIAGNOSIS — R00.2: ICD-10-CM

## 2021-07-29 DIAGNOSIS — E78.2: ICD-10-CM

## 2021-07-29 DIAGNOSIS — E11.9: ICD-10-CM

## 2021-07-29 DIAGNOSIS — R06.02: ICD-10-CM

## 2021-07-29 DIAGNOSIS — G47.33: ICD-10-CM

## 2021-07-29 DIAGNOSIS — R60.9: ICD-10-CM

## 2021-07-29 DIAGNOSIS — I65.23: ICD-10-CM

## 2021-07-29 DIAGNOSIS — Z86.73: ICD-10-CM

## 2021-07-29 DIAGNOSIS — I35.0: ICD-10-CM

## 2021-07-29 DIAGNOSIS — I25.118: ICD-10-CM

## 2021-07-29 DIAGNOSIS — Z82.49: ICD-10-CM

## 2021-07-29 DIAGNOSIS — I27.20: ICD-10-CM

## 2021-07-29 LAB
CATHL ACTIVATED CLOTTING TIME: 181 SEC (ref 74–125)
CATHL VENOUS O2 SAT: 62 % (ref 75–80)
SAO2 % BLDA: 61 % (ref 90–100)

## 2021-07-29 PROCEDURE — C1769 GUIDE WIRE: HCPCS

## 2021-07-29 PROCEDURE — 99153 MOD SED SAME PHYS/QHP EA: CPT

## 2021-07-29 PROCEDURE — C1894 INTRO/SHEATH, NON-LASER: HCPCS

## 2021-07-29 PROCEDURE — 82810 BLOOD GASES O2 SAT ONLY: CPT

## 2021-07-29 PROCEDURE — 93460 R&L HRT ART/VENTRICLE ANGIO: CPT

## 2021-07-29 PROCEDURE — C1725 CATH, TRANSLUMIN NON-LASER: HCPCS

## 2021-07-29 PROCEDURE — 99152 MOD SED SAME PHYS/QHP 5/>YRS: CPT

## 2021-07-29 PROCEDURE — U0003 INFECTIOUS AGENT DETECTION BY NUCLEIC ACID (DNA OR RNA); SEVERE ACUTE RESPIRATORY SYNDROME CORONAVIRUS 2 (SARS-COV-2) (CORONAVIRUS DISEASE [COVID-19]), AMPLIFIED PROBE TECHNIQUE, MAKING USE OF HIGH THROUGHPUT TECHNOLOGIES AS DESCRIBED BY CMS-2020-01-R: HCPCS

## 2021-07-29 PROCEDURE — 85347 COAGULATION TIME ACTIVATED: CPT

## 2021-09-24 ENCOUNTER — HOSPITAL ENCOUNTER (INPATIENT)
Age: 66
LOS: 1 days | Discharge: ANOTHER HOSPITAL | DRG: 307 | End: 2021-09-25
Payer: COMMERCIAL

## 2021-09-24 VITALS
HEART RATE: 71 BPM | RESPIRATION RATE: 18 BRPM | DIASTOLIC BLOOD PRESSURE: 58 MMHG | SYSTOLIC BLOOD PRESSURE: 115 MMHG | TEMPERATURE: 98.9 F | OXYGEN SATURATION: 96 %

## 2021-09-24 VITALS
HEART RATE: 54 BPM | OXYGEN SATURATION: 94 % | DIASTOLIC BLOOD PRESSURE: 66 MMHG | SYSTOLIC BLOOD PRESSURE: 142 MMHG | RESPIRATION RATE: 20 BRPM | TEMPERATURE: 99.32 F

## 2021-09-24 VITALS
HEART RATE: 80 BPM | OXYGEN SATURATION: 98 % | RESPIRATION RATE: 18 BRPM | TEMPERATURE: 98.3 F | SYSTOLIC BLOOD PRESSURE: 132 MMHG | DIASTOLIC BLOOD PRESSURE: 64 MMHG

## 2021-09-24 VITALS — BODY MASS INDEX: 57 KG/M2

## 2021-09-24 DIAGNOSIS — I27.20: ICD-10-CM

## 2021-09-24 DIAGNOSIS — Z79.84: ICD-10-CM

## 2021-09-24 DIAGNOSIS — Z20.822: ICD-10-CM

## 2021-09-24 DIAGNOSIS — E78.5: ICD-10-CM

## 2021-09-24 DIAGNOSIS — I11.0: ICD-10-CM

## 2021-09-24 DIAGNOSIS — I65.23: ICD-10-CM

## 2021-09-24 DIAGNOSIS — E03.9: ICD-10-CM

## 2021-09-24 DIAGNOSIS — I35.0: Primary | ICD-10-CM

## 2021-09-24 DIAGNOSIS — E11.9: ICD-10-CM

## 2021-09-24 DIAGNOSIS — Z79.899: ICD-10-CM

## 2021-09-24 DIAGNOSIS — Z82.49: ICD-10-CM

## 2021-09-24 DIAGNOSIS — Z86.73: ICD-10-CM

## 2021-09-24 DIAGNOSIS — I50.9: ICD-10-CM

## 2021-09-24 LAB
ANION GAP SERPL CALC-SCNC: 15.4 MEQ/L (ref 5–15)
BUN SERPL-MCNC: 15 MG/DL (ref 7–17)
CALCIUM SPEC-MCNC: 9.3 MG/DL (ref 8.4–10.2)
CHLORIDE SPEC-SCNC: 104 MMOL/L (ref 98–107)
CO2 SERPL-SCNC: 25 MMOL/L (ref 22–30)
CREAT BLD-SCNC: 0.9 MG/DL (ref 0.52–1.04)
CREATININE CLEARANCE ESTIMATED: 38 ML/MIN (ref 50–200)
ESTIMATED GLOMERULAR FILT RATE: 63 ML/MIN (ref 60–?)
FT4I SERPL CALC-MCNC: 3.2 UG/DL (ref 5.93–13.13)
GFR (AFRICAN AMERICAN): 76 ML/MIN (ref 60–?)
GLUCOSE: 88 MG/DL (ref 74–100)
HCT VFR BLD CALC: 37.2 % (ref 37–47)
HGB BLD-MCNC: 11 G/DL (ref 12.2–16.2)
MCHC RBC-ENTMCNC: 29.5 G/DL (ref 31.8–35.4)
MCV RBC: 76.2 FL (ref 81–99)
MEAN CORPUSCULAR HEMOGLOBIN: 22.4 PG (ref 27–31.2)
NT PRO BRAIN NATRIURETIC PEP.: 167 PG/ML (ref 0–125)
PLATELET # BLD: 516 K/MM3 (ref 142–424)
POTASSIUM: 5.4 MMOL/L (ref 3.5–5.1)
RBC # BLD AUTO: 4.89 M/MM3 (ref 4.2–5.4)
SODIUM SPEC-SCNC: 139 MMOL/L (ref 136–145)
T3RU NFR SERPL: 27 % (ref 23.5–40.5)
T4 (THYROXINE): 11.9 UG/DL (ref 5.53–11)
TSH SERPL-ACNC: 0.74 UIU/ML (ref 0.47–4.68)
WBC # BLD AUTO: 10.4 K/MM3 (ref 4.8–10.8)

## 2021-09-24 PROCEDURE — G0378 HOSPITAL OBSERVATION PER HR: HCPCS

## 2021-09-24 PROCEDURE — U0003 INFECTIOUS AGENT DETECTION BY NUCLEIC ACID (DNA OR RNA); SEVERE ACUTE RESPIRATORY SYNDROME CORONAVIRUS 2 (SARS-COV-2) (CORONAVIRUS DISEASE [COVID-19]), AMPLIFIED PROBE TECHNIQUE, MAKING USE OF HIGH THROUGHPUT TECHNOLOGIES AS DESCRIBED BY CMS-2020-01-R: HCPCS

## 2021-09-24 PROCEDURE — 83880 ASSAY OF NATRIURETIC PEPTIDE: CPT

## 2021-09-24 PROCEDURE — 84443 ASSAY THYROID STIM HORMONE: CPT

## 2021-09-24 PROCEDURE — U0005 INFEC AGEN DETEC AMPLI PROBE: HCPCS

## 2021-09-24 PROCEDURE — C9803 HOPD COVID-19 SPEC COLLECT: HCPCS

## 2021-09-24 PROCEDURE — 71045 X-RAY EXAM CHEST 1 VIEW: CPT

## 2021-09-24 PROCEDURE — 80048 BASIC METABOLIC PNL TOTAL CA: CPT

## 2021-09-24 PROCEDURE — 85025 COMPLETE CBC W/AUTO DIFF WBC: CPT

## 2021-09-24 PROCEDURE — 84479 ASSAY OF THYROID (T3 OR T4): CPT

## 2021-09-24 PROCEDURE — 84436 ASSAY OF TOTAL THYROXINE: CPT

## 2021-09-25 VITALS
RESPIRATION RATE: 18 BRPM | HEART RATE: 80 BPM | OXYGEN SATURATION: 96 % | SYSTOLIC BLOOD PRESSURE: 136 MMHG | TEMPERATURE: 98.24 F | DIASTOLIC BLOOD PRESSURE: 64 MMHG

## 2021-11-11 ENCOUNTER — HOSPITAL ENCOUNTER (OUTPATIENT)
Age: 66
End: 2021-11-11
Payer: COMMERCIAL

## 2021-11-11 DIAGNOSIS — I65.23: ICD-10-CM

## 2021-11-11 DIAGNOSIS — I10: ICD-10-CM

## 2021-11-11 DIAGNOSIS — I35.0: ICD-10-CM

## 2021-11-11 DIAGNOSIS — Z79.84: ICD-10-CM

## 2021-11-11 DIAGNOSIS — R94.31: ICD-10-CM

## 2021-11-11 DIAGNOSIS — E78.2: ICD-10-CM

## 2021-11-11 DIAGNOSIS — I25.10: ICD-10-CM

## 2021-11-11 DIAGNOSIS — E11.9: ICD-10-CM

## 2021-11-11 DIAGNOSIS — R60.9: ICD-10-CM

## 2021-11-11 DIAGNOSIS — I50.9: ICD-10-CM

## 2021-11-11 DIAGNOSIS — R06.02: Primary | ICD-10-CM

## 2021-11-11 DIAGNOSIS — Z86.73: ICD-10-CM

## 2021-11-11 LAB
ALBUMIN LEVEL: 3.8 G/DL (ref 3.5–5)
ALP ISO SERPL-ACNC: 160 U/L (ref 38–126)
ALT SERPLBLD-CCNC: 19 U/L (ref 12–78)
ANION GAP SERPL CALC-SCNC: 16.7 MEQ/L (ref 5–15)
AST SERPL QL: 25 U/L (ref 14–36)
BILIRUB DIRECT SERPL-MCNC: 0.3 MG/DL (ref 0–0.4)
BILIRUB INDIRECT SERPL-MCNC: 0 MG/DL (ref 0–0.9)
BILIRUB INDIRECT SERPL-MCNC: 0 MG/DL (ref 0–1.1)
BILIRUBIN,TOTAL: 0.3 MG/DL (ref 0.2–1.3)
BUN SERPL-MCNC: 9 MG/DL (ref 7–17)
CALCIUM SPEC-MCNC: 8.8 MG/DL (ref 8.4–10.2)
CHLORIDE SPEC-SCNC: 103 MMOL/L (ref 98–107)
CHOLEST SPEC-SCNC: 135 MG/DL (ref 140–200)
CO2 SERPL-SCNC: 24 MMOL/L (ref 22–30)
CREAT BLD-SCNC: 0.6 MG/DL (ref 0.52–1.04)
ESTIMATED GLOMERULAR FILT RATE: 100 ML/MIN (ref 60–?)
GFR (AFRICAN AMERICAN): 121 ML/MIN (ref 60–?)
GLUCOSE: 110 MG/DL (ref 74–100)
HDLC SERPL-MCNC: 47 MG/DL (ref 40–60)
NT PRO BRAIN NATRIURETIC PEP.: 580 PG/ML (ref 0–125)
POTASSIUM: 3.7 MMOL/L (ref 3.5–5.1)
PROT SERPL-MCNC: 6.4 G/DL (ref 6.3–8.2)
SODIUM SPEC-SCNC: 140 MMOL/L (ref 136–145)
TRIGLYCERIDES: 193 MG/DL (ref 30–150)

## 2021-11-11 PROCEDURE — 83880 ASSAY OF NATRIURETIC PEPTIDE: CPT

## 2021-11-11 PROCEDURE — 93306 TTE W/DOPPLER COMPLETE: CPT

## 2021-11-11 PROCEDURE — 80076 HEPATIC FUNCTION PANEL: CPT

## 2021-11-11 PROCEDURE — 36415 COLL VENOUS BLD VENIPUNCTURE: CPT

## 2021-11-11 PROCEDURE — 80061 LIPID PANEL: CPT

## 2021-11-11 PROCEDURE — 80048 BASIC METABOLIC PNL TOTAL CA: CPT

## 2021-11-16 ENCOUNTER — HOSPITAL ENCOUNTER (OUTPATIENT)
Dept: HOSPITAL 22 - PT | Age: 66
LOS: 133 days | Discharge: HOME HEALTH | End: 2022-03-29
Payer: COMMERCIAL

## 2021-11-16 DIAGNOSIS — I25.10: Primary | ICD-10-CM

## 2021-11-16 DIAGNOSIS — Z95.2: ICD-10-CM

## 2021-11-16 PROCEDURE — 93798 PHYS/QHP OP CAR RHAB W/ECG: CPT

## 2021-11-22 ENCOUNTER — HOSPITAL ENCOUNTER (EMERGENCY)
Age: 66
Discharge: HOME | End: 2021-11-22
Payer: COMMERCIAL

## 2021-11-22 VITALS
RESPIRATION RATE: 18 BRPM | DIASTOLIC BLOOD PRESSURE: 58 MMHG | HEART RATE: 87 BPM | SYSTOLIC BLOOD PRESSURE: 113 MMHG | TEMPERATURE: 98.7 F | OXYGEN SATURATION: 96 %

## 2021-11-22 VITALS
HEART RATE: 83 BPM | SYSTOLIC BLOOD PRESSURE: 152 MMHG | OXYGEN SATURATION: 99 % | DIASTOLIC BLOOD PRESSURE: 46 MMHG | TEMPERATURE: 98.2 F | RESPIRATION RATE: 18 BRPM

## 2021-11-22 VITALS — BODY MASS INDEX: 54.5 KG/M2

## 2021-11-22 DIAGNOSIS — F33.1: ICD-10-CM

## 2021-11-22 DIAGNOSIS — S00.03XA: Primary | ICD-10-CM
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
## 2021-11-22 DIAGNOSIS — Z86.73: ICD-10-CM

## 2021-11-22 DIAGNOSIS — S80.811A: ICD-10-CM
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
## 2021-11-22 DIAGNOSIS — I25.10: ICD-10-CM

## 2021-11-22 DIAGNOSIS — S50.312A: ICD-10-CM

## 2021-11-22 DIAGNOSIS — Z95.2: ICD-10-CM

## 2021-11-22 DIAGNOSIS — E11.9: ICD-10-CM

## 2021-11-22 DIAGNOSIS — Z79.899: ICD-10-CM

## 2021-11-22 DIAGNOSIS — E78.5: ICD-10-CM

## 2021-11-22 DIAGNOSIS — E03.9: ICD-10-CM

## 2021-11-22 DIAGNOSIS — K21.9: ICD-10-CM

## 2021-11-22 DIAGNOSIS — I10: ICD-10-CM

## 2021-11-22 PROCEDURE — 73590 X-RAY EXAM OF LOWER LEG: CPT

## 2021-11-22 PROCEDURE — 72131 CT LUMBAR SPINE W/O DYE: CPT

## 2021-11-22 PROCEDURE — 70450 CT HEAD/BRAIN W/O DYE: CPT

## 2021-11-22 PROCEDURE — 99282 EMERGENCY DEPT VISIT SF MDM: CPT

## 2021-11-22 PROCEDURE — 73060 X-RAY EXAM OF HUMERUS: CPT

## 2021-11-26 ENCOUNTER — HOSPITAL ENCOUNTER (OUTPATIENT)
Age: 66
End: 2021-11-26
Payer: COMMERCIAL

## 2021-11-26 DIAGNOSIS — M54.6: ICD-10-CM

## 2021-11-26 DIAGNOSIS — M54.2: Primary | ICD-10-CM
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
## 2021-11-26 PROCEDURE — 72084 X-RAY EXAM ENTIRE SPI 6/> VW: CPT

## 2021-12-03 ENCOUNTER — HOSPITAL ENCOUNTER (OUTPATIENT)
Age: 66
End: 2021-12-03
Payer: COMMERCIAL

## 2021-12-03 DIAGNOSIS — I11.0: ICD-10-CM

## 2021-12-03 DIAGNOSIS — R60.9: ICD-10-CM

## 2021-12-03 DIAGNOSIS — Z79.84: ICD-10-CM

## 2021-12-03 DIAGNOSIS — Z86.73: ICD-10-CM

## 2021-12-03 DIAGNOSIS — G47.33: ICD-10-CM

## 2021-12-03 DIAGNOSIS — Z95.2: ICD-10-CM

## 2021-12-03 DIAGNOSIS — R06.02: Primary | ICD-10-CM

## 2021-12-03 DIAGNOSIS — I35.8: ICD-10-CM

## 2021-12-03 DIAGNOSIS — I27.20: ICD-10-CM

## 2021-12-03 DIAGNOSIS — I50.9: ICD-10-CM

## 2021-12-03 DIAGNOSIS — I35.0: ICD-10-CM

## 2021-12-03 DIAGNOSIS — I65.23: ICD-10-CM

## 2021-12-03 DIAGNOSIS — I25.10: ICD-10-CM

## 2021-12-03 DIAGNOSIS — R94.31: ICD-10-CM

## 2021-12-03 DIAGNOSIS — E78.2: ICD-10-CM

## 2021-12-03 DIAGNOSIS — E11.9: ICD-10-CM

## 2021-12-03 LAB
ANION GAP SERPL CALC-SCNC: 15 MEQ/L (ref 5–15)
BUN SERPL-MCNC: 5 MG/DL (ref 7–17)
CALCIUM SPEC-MCNC: 9.1 MG/DL (ref 8.4–10.2)
CHLORIDE SPEC-SCNC: 103 MMOL/L (ref 98–107)
CO2 SERPL-SCNC: 28 MMOL/L (ref 22–30)
CREAT BLD-SCNC: 0.7 MG/DL (ref 0.52–1.04)
ESTIMATED GLOMERULAR FILT RATE: 84 ML/MIN (ref 60–?)
GFR (AFRICAN AMERICAN): 102 ML/MIN (ref 60–?)
GLUCOSE: 94 MG/DL (ref 74–100)
NT PRO BRAIN NATRIURETIC PEP.: 427 PG/ML (ref 0–125)
POTASSIUM: 4 MMOL/L (ref 3.5–5.1)
SODIUM SPEC-SCNC: 142 MMOL/L (ref 136–145)

## 2021-12-03 PROCEDURE — 80048 BASIC METABOLIC PNL TOTAL CA: CPT

## 2021-12-03 PROCEDURE — 36415 COLL VENOUS BLD VENIPUNCTURE: CPT

## 2021-12-03 PROCEDURE — 83880 ASSAY OF NATRIURETIC PEPTIDE: CPT

## 2021-12-23 ENCOUNTER — HOSPITAL ENCOUNTER (OUTPATIENT)
Age: 66
End: 2021-12-23
Payer: COMMERCIAL

## 2021-12-23 DIAGNOSIS — G47.33: ICD-10-CM

## 2021-12-23 DIAGNOSIS — Z95.2: ICD-10-CM

## 2021-12-23 DIAGNOSIS — R06.00: Primary | ICD-10-CM

## 2021-12-23 DIAGNOSIS — I65.29: ICD-10-CM

## 2021-12-23 DIAGNOSIS — R94.31: ICD-10-CM

## 2021-12-23 DIAGNOSIS — I10: ICD-10-CM

## 2021-12-23 DIAGNOSIS — I35.8: ICD-10-CM

## 2021-12-23 DIAGNOSIS — I35.0: ICD-10-CM

## 2021-12-23 DIAGNOSIS — I27.20: ICD-10-CM

## 2021-12-23 DIAGNOSIS — E78.5: ICD-10-CM

## 2021-12-23 DIAGNOSIS — I25.10: ICD-10-CM

## 2021-12-23 DIAGNOSIS — R60.9: ICD-10-CM

## 2021-12-23 DIAGNOSIS — E11.9: ICD-10-CM

## 2021-12-23 DIAGNOSIS — I50.9: ICD-10-CM

## 2021-12-23 DIAGNOSIS — Z86.73: ICD-10-CM

## 2021-12-23 LAB
ANION GAP SERPL CALC-SCNC: 13.2 MEQ/L (ref 5–15)
BUN SERPL-MCNC: 12 MG/DL (ref 7–17)
CALCIUM SPEC-MCNC: 8.5 MG/DL (ref 8.4–10.2)
CHLORIDE SPEC-SCNC: 103 MMOL/L (ref 98–107)
CO2 SERPL-SCNC: 26 MMOL/L (ref 22–30)
CREAT BLD-SCNC: 0.9 MG/DL (ref 0.52–1.04)
ESTIMATED GLOMERULAR FILT RATE: 63 ML/MIN (ref 60–?)
GFR (AFRICAN AMERICAN): 76 ML/MIN (ref 60–?)
GLUCOSE: 107 MG/DL (ref 74–100)
POTASSIUM: 4.2 MMOL/L (ref 3.5–5.1)
SODIUM SPEC-SCNC: 138 MMOL/L (ref 136–145)

## 2021-12-23 PROCEDURE — 36415 COLL VENOUS BLD VENIPUNCTURE: CPT

## 2021-12-23 PROCEDURE — 80048 BASIC METABOLIC PNL TOTAL CA: CPT

## 2022-01-04 ENCOUNTER — HOSPITAL ENCOUNTER (OUTPATIENT)
Age: 67
End: 2022-01-04
Payer: COMMERCIAL

## 2022-01-04 DIAGNOSIS — Z12.31: Primary | ICD-10-CM

## 2022-01-04 PROCEDURE — 77063 BREAST TOMOSYNTHESIS BI: CPT

## 2022-01-04 PROCEDURE — 77067 SCR MAMMO BI INCL CAD: CPT

## 2022-01-12 ENCOUNTER — HOSPITAL ENCOUNTER (OUTPATIENT)
Age: 67
End: 2022-01-12
Payer: COMMERCIAL

## 2022-01-12 DIAGNOSIS — Z20.822: Primary | ICD-10-CM

## 2022-01-12 LAB
HCT VFR BLD CALC: 33.6 % (ref 37–47)
HGB BLD-MCNC: 9.7 G/DL (ref 12.2–16.2)
MCHC RBC-ENTMCNC: 28.9 G/DL (ref 31.8–35.4)
MCV RBC: 71.6 FL (ref 81–99)
MEAN CORPUSCULAR HEMOGLOBIN: 20.7 PG (ref 27–31.2)
PLATELET # BLD: 503 K/MM3 (ref 142–424)
RBC # BLD AUTO: 4.69 M/MM3 (ref 4.2–5.4)
WBC # BLD AUTO: 7.5 K/MM3 (ref 4.8–10.8)

## 2022-01-12 PROCEDURE — 85025 COMPLETE CBC W/AUTO DIFF WBC: CPT

## 2022-01-12 PROCEDURE — 87798 DETECT AGENT NOS DNA AMP: CPT

## 2022-01-12 PROCEDURE — U0003 INFECTIOUS AGENT DETECTION BY NUCLEIC ACID (DNA OR RNA); SEVERE ACUTE RESPIRATORY SYNDROME CORONAVIRUS 2 (SARS-COV-2) (CORONAVIRUS DISEASE [COVID-19]), AMPLIFIED PROBE TECHNIQUE, MAKING USE OF HIGH THROUGHPUT TECHNOLOGIES AS DESCRIBED BY CMS-2020-01-R: HCPCS

## 2022-01-12 PROCEDURE — C9803 HOPD COVID-19 SPEC COLLECT: HCPCS

## 2022-01-12 PROCEDURE — 87632 RESP VIRUS 6-11 TARGETS: CPT

## 2022-01-12 PROCEDURE — 36415 COLL VENOUS BLD VENIPUNCTURE: CPT

## 2022-01-12 PROCEDURE — 87581 M.PNEUMON DNA AMP PROBE: CPT

## 2022-01-12 PROCEDURE — U0005 INFEC AGEN DETEC AMPLI PROBE: HCPCS

## 2022-01-17 ENCOUNTER — HOSPITAL ENCOUNTER (EMERGENCY)
Age: 67
Discharge: HOME | End: 2022-01-17
Payer: COMMERCIAL

## 2022-01-17 VITALS — SYSTOLIC BLOOD PRESSURE: 88 MMHG | DIASTOLIC BLOOD PRESSURE: 53 MMHG | HEART RATE: 74 BPM | OXYGEN SATURATION: 99 %

## 2022-01-17 VITALS
RESPIRATION RATE: 18 BRPM | TEMPERATURE: 99.8 F | DIASTOLIC BLOOD PRESSURE: 41 MMHG | SYSTOLIC BLOOD PRESSURE: 98 MMHG | OXYGEN SATURATION: 95 % | HEART RATE: 81 BPM

## 2022-01-17 VITALS — BODY MASS INDEX: 51.5 KG/M2

## 2022-01-17 VITALS
OXYGEN SATURATION: 98 % | TEMPERATURE: 98.24 F | DIASTOLIC BLOOD PRESSURE: 41 MMHG | HEART RATE: 72 BPM | RESPIRATION RATE: 19 BRPM | SYSTOLIC BLOOD PRESSURE: 117 MMHG

## 2022-01-17 DIAGNOSIS — R42: Primary | ICD-10-CM

## 2022-01-17 DIAGNOSIS — E11.9: ICD-10-CM

## 2022-01-17 DIAGNOSIS — K21.9: ICD-10-CM

## 2022-01-17 DIAGNOSIS — E78.5: ICD-10-CM

## 2022-01-17 DIAGNOSIS — Z95.2: ICD-10-CM

## 2022-01-17 DIAGNOSIS — Z88.6: ICD-10-CM

## 2022-01-17 DIAGNOSIS — Z79.899: ICD-10-CM

## 2022-01-17 DIAGNOSIS — I25.10: ICD-10-CM

## 2022-01-17 LAB
ALBUMIN LEVEL: 4.3 G/DL (ref 3.5–5)
ALBUMIN/GLOB SERPL: 1.3 {RATIO} (ref 1.1–1.8)
ALP ISO SERPL-ACNC: 167 U/L (ref 38–126)
ALT SERPLBLD-CCNC: 26 U/L (ref 12–78)
ANION GAP SERPL CALC-SCNC: 16.7 MEQ/L (ref 5–15)
AST SERPL QL: 36 U/L (ref 14–36)
BILIRUBIN,TOTAL: 0.5 MG/DL (ref 0.2–1.3)
BUN SERPL-MCNC: 13 MG/DL (ref 7–17)
CALCIUM SPEC-MCNC: 9.2 MG/DL (ref 8.4–10.2)
CHLORIDE SPEC-SCNC: 102 MMOL/L (ref 98–107)
CO2 SERPL-SCNC: 23 MMOL/L (ref 22–30)
CREAT BLD-SCNC: 1 MG/DL (ref 0.52–1.04)
ESTIMATED GLOMERULAR FILT RATE: 56 ML/MIN (ref 60–?)
GFR (AFRICAN AMERICAN): 67 ML/MIN (ref 60–?)
GLOBULIN SER CALC-MCNC: 3.4 G/DL (ref 1.3–3.2)
GLUCOSE: 160 MG/DL (ref 74–100)
HCT VFR BLD CALC: 36.7 % (ref 37–47)
HGB BLD-MCNC: 10.8 G/DL (ref 12.2–16.2)
MCHC RBC-ENTMCNC: 29.5 G/DL (ref 31.8–35.4)
MCV RBC: 70 FL (ref 81–99)
MEAN CORPUSCULAR HEMOGLOBIN: 20.7 PG (ref 27–31.2)
PLATELET # BLD: 613 K/MM3 (ref 142–424)
POTASSIUM: 3.7 MMOL/L (ref 3.5–5.1)
PROT SERPL-MCNC: 7.7 G/DL (ref 6.3–8.2)
RBC # BLD AUTO: 5.24 M/MM3 (ref 4.2–5.4)
SODIUM SPEC-SCNC: 138 MMOL/L (ref 136–145)
TROPONIN I: < 0.01 NG/ML (ref 0–0.03)
WBC # BLD AUTO: 10.3 K/MM3 (ref 4.8–10.8)

## 2022-01-17 PROCEDURE — 85025 COMPLETE CBC W/AUTO DIFF WBC: CPT

## 2022-01-17 PROCEDURE — 84484 ASSAY OF TROPONIN QUANT: CPT

## 2022-01-17 PROCEDURE — 93005 ELECTROCARDIOGRAM TRACING: CPT

## 2022-01-17 PROCEDURE — 80053 COMPREHEN METABOLIC PANEL: CPT

## 2022-01-17 PROCEDURE — 70450 CT HEAD/BRAIN W/O DYE: CPT

## 2022-01-17 PROCEDURE — 99283 EMERGENCY DEPT VISIT LOW MDM: CPT

## 2022-01-20 ENCOUNTER — TELEPHONE (OUTPATIENT)
Dept: CARDIOLOGY | Facility: HOSPITAL | Age: 67
End: 2022-01-20

## 2022-01-20 NOTE — TELEPHONE ENCOUNTER
ABHINAV FOX    Spoke with patient.  She states she had TAVR per Dr. Ivy @ [facility] in October 2021.      Debi FOX

## 2022-02-01 ENCOUNTER — HOSPITAL ENCOUNTER (OUTPATIENT)
Age: 67
End: 2022-02-01
Payer: COMMERCIAL

## 2022-02-01 DIAGNOSIS — R92.8: Primary | ICD-10-CM

## 2022-02-01 PROCEDURE — 77065 DX MAMMO INCL CAD UNI: CPT

## 2022-02-01 PROCEDURE — G0279 TOMOSYNTHESIS, MAMMO: HCPCS

## 2022-02-01 PROCEDURE — 77061 BREAST TOMOSYNTHESIS UNI: CPT

## 2022-03-14 ENCOUNTER — HOSPITAL ENCOUNTER (OUTPATIENT)
Age: 67
End: 2022-03-14
Payer: COMMERCIAL

## 2022-03-14 DIAGNOSIS — I25.10: ICD-10-CM

## 2022-03-14 DIAGNOSIS — I35.0: ICD-10-CM

## 2022-03-14 DIAGNOSIS — I10: ICD-10-CM

## 2022-03-14 DIAGNOSIS — E78.2: ICD-10-CM

## 2022-03-14 DIAGNOSIS — I65.23: ICD-10-CM

## 2022-03-14 DIAGNOSIS — R06.02: Primary | ICD-10-CM

## 2022-03-14 LAB
ANION GAP SERPL CALC-SCNC: 13.4 MEQ/L (ref 5–15)
BUN SERPL-MCNC: 16 MG/DL (ref 7–17)
CALCIUM SPEC-MCNC: 8.7 MG/DL (ref 8.4–10.2)
CHLORIDE SPEC-SCNC: 106 MMOL/L (ref 98–107)
CO2 SERPL-SCNC: 23 MMOL/L (ref 22–30)
CREAT BLD-SCNC: 1.1 MG/DL (ref 0.52–1.04)
ESTIMATED GLOMERULAR FILT RATE: 50 ML/MIN (ref 60–?)
GFR (AFRICAN AMERICAN): 60 ML/MIN (ref 60–?)
GLUCOSE: 103 MG/DL (ref 74–100)
HCT VFR BLD CALC: 27.9 % (ref 37–47)
HGB BLD-MCNC: 8.3 G/DL (ref 12.2–16.2)
MCHC RBC-ENTMCNC: 29.8 G/DL (ref 31.8–35.4)
MCV RBC: 65.3 FL (ref 81–99)
MEAN CORPUSCULAR HEMOGLOBIN: 19.5 PG (ref 27–31.2)
NT PRO BRAIN NATRIURETIC PEP.: 265 PG/ML (ref 0–125)
PLATELET # BLD: 508 K/MM3 (ref 142–424)
POTASSIUM: 4.4 MMOL/L (ref 3.5–5.1)
RBC # BLD AUTO: 4.27 M/MM3 (ref 4.2–5.4)
SODIUM SPEC-SCNC: 138 MMOL/L (ref 136–145)
WBC # BLD AUTO: 9.3 K/MM3 (ref 4.8–10.8)

## 2022-03-14 PROCEDURE — 80048 BASIC METABOLIC PNL TOTAL CA: CPT

## 2022-03-14 PROCEDURE — 83880 ASSAY OF NATRIURETIC PEPTIDE: CPT

## 2022-03-14 PROCEDURE — 85025 COMPLETE CBC W/AUTO DIFF WBC: CPT

## 2022-03-14 PROCEDURE — 36415 COLL VENOUS BLD VENIPUNCTURE: CPT

## 2022-03-15 ENCOUNTER — HOSPITAL ENCOUNTER (OUTPATIENT)
Age: 67
End: 2022-03-15
Payer: COMMERCIAL

## 2022-03-15 DIAGNOSIS — R06.02: Primary | ICD-10-CM

## 2022-03-15 DIAGNOSIS — D64.9: ICD-10-CM

## 2022-03-15 PROCEDURE — 86850 RBC ANTIBODY SCREEN: CPT

## 2022-03-15 PROCEDURE — 36415 COLL VENOUS BLD VENIPUNCTURE: CPT

## 2022-03-16 ENCOUNTER — HOSPITAL ENCOUNTER (OUTPATIENT)
Dept: HOSPITAL 22 - INF | Age: 67
Discharge: HOME | End: 2022-03-16
Payer: COMMERCIAL

## 2022-03-16 VITALS
SYSTOLIC BLOOD PRESSURE: 123 MMHG | OXYGEN SATURATION: 97 % | RESPIRATION RATE: 18 BRPM | HEART RATE: 74 BPM | DIASTOLIC BLOOD PRESSURE: 56 MMHG | TEMPERATURE: 97.2 F

## 2022-03-16 VITALS
SYSTOLIC BLOOD PRESSURE: 123 MMHG | RESPIRATION RATE: 18 BRPM | OXYGEN SATURATION: 97 % | TEMPERATURE: 97.34 F | HEART RATE: 71 BPM | DIASTOLIC BLOOD PRESSURE: 67 MMHG

## 2022-03-16 VITALS
SYSTOLIC BLOOD PRESSURE: 117 MMHG | OXYGEN SATURATION: 98 % | TEMPERATURE: 97.3 F | HEART RATE: 69 BPM | RESPIRATION RATE: 18 BRPM | DIASTOLIC BLOOD PRESSURE: 49 MMHG

## 2022-03-16 VITALS
RESPIRATION RATE: 18 BRPM | TEMPERATURE: 97.3 F | DIASTOLIC BLOOD PRESSURE: 61 MMHG | SYSTOLIC BLOOD PRESSURE: 114 MMHG | HEART RATE: 76 BPM | OXYGEN SATURATION: 97 %

## 2022-03-16 VITALS
OXYGEN SATURATION: 97 % | TEMPERATURE: 97.3 F | RESPIRATION RATE: 18 BRPM | SYSTOLIC BLOOD PRESSURE: 110 MMHG | HEART RATE: 75 BPM | DIASTOLIC BLOOD PRESSURE: 70 MMHG

## 2022-03-16 VITALS
DIASTOLIC BLOOD PRESSURE: 67 MMHG | OXYGEN SATURATION: 98 % | HEART RATE: 73 BPM | RESPIRATION RATE: 18 BRPM | SYSTOLIC BLOOD PRESSURE: 123 MMHG | TEMPERATURE: 97.2 F

## 2022-03-16 VITALS
DIASTOLIC BLOOD PRESSURE: 52 MMHG | OXYGEN SATURATION: 98 % | HEART RATE: 71 BPM | SYSTOLIC BLOOD PRESSURE: 119 MMHG | TEMPERATURE: 97.3 F | RESPIRATION RATE: 18 BRPM

## 2022-03-16 VITALS
DIASTOLIC BLOOD PRESSURE: 65 MMHG | HEART RATE: 70 BPM | TEMPERATURE: 97.4 F | SYSTOLIC BLOOD PRESSURE: 136 MMHG | OXYGEN SATURATION: 98 % | RESPIRATION RATE: 18 BRPM

## 2022-03-16 VITALS
HEART RATE: 71 BPM | TEMPERATURE: 97.34 F | DIASTOLIC BLOOD PRESSURE: 60 MMHG | SYSTOLIC BLOOD PRESSURE: 121 MMHG | OXYGEN SATURATION: 97 % | RESPIRATION RATE: 18 BRPM

## 2022-03-16 VITALS
RESPIRATION RATE: 18 BRPM | HEART RATE: 68 BPM | SYSTOLIC BLOOD PRESSURE: 118 MMHG | OXYGEN SATURATION: 97 % | DIASTOLIC BLOOD PRESSURE: 70 MMHG | TEMPERATURE: 97.16 F

## 2022-03-16 VITALS
TEMPERATURE: 96.98 F | DIASTOLIC BLOOD PRESSURE: 49 MMHG | HEART RATE: 72 BPM | RESPIRATION RATE: 18 BRPM | OXYGEN SATURATION: 97 % | SYSTOLIC BLOOD PRESSURE: 117 MMHG

## 2022-03-16 VITALS
HEART RATE: 71 BPM | SYSTOLIC BLOOD PRESSURE: 110 MMHG | RESPIRATION RATE: 18 BRPM | DIASTOLIC BLOOD PRESSURE: 63 MMHG | TEMPERATURE: 97.5 F | OXYGEN SATURATION: 98 %

## 2022-03-16 VITALS
TEMPERATURE: 97.4 F | DIASTOLIC BLOOD PRESSURE: 65 MMHG | OXYGEN SATURATION: 97 % | SYSTOLIC BLOOD PRESSURE: 136 MMHG | HEART RATE: 70 BPM | RESPIRATION RATE: 18 BRPM

## 2022-03-16 VITALS
RESPIRATION RATE: 18 BRPM | DIASTOLIC BLOOD PRESSURE: 40 MMHG | HEART RATE: 71 BPM | OXYGEN SATURATION: 97 % | SYSTOLIC BLOOD PRESSURE: 111 MMHG | TEMPERATURE: 97.1 F

## 2022-03-16 VITALS
SYSTOLIC BLOOD PRESSURE: 132 MMHG | TEMPERATURE: 97.16 F | RESPIRATION RATE: 18 BRPM | DIASTOLIC BLOOD PRESSURE: 73 MMHG | OXYGEN SATURATION: 97 % | HEART RATE: 71 BPM

## 2022-03-16 VITALS
TEMPERATURE: 97.16 F | HEART RATE: 75 BPM | DIASTOLIC BLOOD PRESSURE: 55 MMHG | RESPIRATION RATE: 18 BRPM | OXYGEN SATURATION: 98 % | SYSTOLIC BLOOD PRESSURE: 114 MMHG

## 2022-03-16 VITALS
TEMPERATURE: 97.34 F | OXYGEN SATURATION: 97 % | HEART RATE: 74 BPM | DIASTOLIC BLOOD PRESSURE: 50 MMHG | SYSTOLIC BLOOD PRESSURE: 137 MMHG | RESPIRATION RATE: 18 BRPM

## 2022-03-16 VITALS
TEMPERATURE: 97.34 F | SYSTOLIC BLOOD PRESSURE: 121 MMHG | HEART RATE: 74 BPM | OXYGEN SATURATION: 97 % | RESPIRATION RATE: 18 BRPM | DIASTOLIC BLOOD PRESSURE: 62 MMHG

## 2022-03-16 VITALS
TEMPERATURE: 97.16 F | OXYGEN SATURATION: 97 % | HEART RATE: 73 BPM | SYSTOLIC BLOOD PRESSURE: 120 MMHG | RESPIRATION RATE: 18 BRPM | DIASTOLIC BLOOD PRESSURE: 45 MMHG

## 2022-03-16 VITALS — BODY MASS INDEX: 52.7 KG/M2

## 2022-03-16 DIAGNOSIS — D64.9: Primary | ICD-10-CM

## 2022-03-16 LAB
HCT VFR BLD CALC: 32.9 % (ref 37–47)
HGB BLD-MCNC: 10.2 G/DL (ref 12.2–16.2)

## 2022-03-16 PROCEDURE — P9016 RBC LEUKOCYTES REDUCED: HCPCS

## 2022-03-16 PROCEDURE — 36430 TRANSFUSION BLD/BLD COMPNT: CPT

## 2022-03-16 PROCEDURE — 85018 HEMOGLOBIN: CPT

## 2022-03-16 PROCEDURE — 85014 HEMATOCRIT: CPT

## 2022-05-23 ENCOUNTER — HOSPITAL ENCOUNTER (OUTPATIENT)
Age: 67
End: 2022-05-23
Payer: COMMERCIAL

## 2022-05-23 DIAGNOSIS — Z00.00: Primary | ICD-10-CM

## 2022-05-23 LAB
ALBUMIN LEVEL: 3.6 G/DL (ref 3.5–5)
ALBUMIN/GLOB SERPL: 1.4 {RATIO} (ref 1.1–1.8)
ALP ISO SERPL-ACNC: 145 U/L (ref 38–126)
ALT SERPLBLD-CCNC: 20 U/L (ref 12–78)
ANION GAP SERPL CALC-SCNC: 14.2 MEQ/L (ref 5–15)
AST SERPL QL: 24 U/L (ref 14–36)
BILIRUBIN,TOTAL: < 0.1 MG/DL (ref 0.2–1.3)
BUN SERPL-MCNC: 17 MG/DL (ref 7–17)
CALCIUM SPEC-MCNC: 9 MG/DL (ref 8.4–10.2)
CHLORIDE SPEC-SCNC: 105 MMOL/L (ref 98–107)
CO2 SERPL-SCNC: 23 MMOL/L (ref 22–30)
CREAT BLD-SCNC: 1 MG/DL (ref 0.52–1.04)
ESTIMATED GLOMERULAR FILT RATE: 55 ML/MIN (ref 60–?)
GFR (AFRICAN AMERICAN): 67 ML/MIN (ref 60–?)
GLOBULIN SER CALC-MCNC: 2.5 G/DL (ref 1.3–3.2)
GLUCOSE: 110 MG/DL (ref 74–100)
HCT VFR BLD CALC: 36.3 % (ref 37–47)
HGB BLD-MCNC: 11.1 G/DL (ref 12.2–16.2)
MCHC RBC-ENTMCNC: 30.6 G/DL (ref 31.8–35.4)
MCV RBC: 73.8 FL (ref 81–99)
MEAN CORPUSCULAR HEMOGLOBIN: 22.6 PG (ref 27–31.2)
PLATELET # BLD: 400 K/MM3 (ref 142–424)
POTASSIUM: 4.2 MMOL/L (ref 3.5–5.1)
PROT SERPL-MCNC: 6.1 G/DL (ref 6.3–8.2)
RBC # BLD AUTO: 4.92 M/MM3 (ref 4.2–5.4)
SODIUM SPEC-SCNC: 138 MMOL/L (ref 136–145)
WBC # BLD AUTO: 8.9 K/MM3 (ref 4.8–10.8)

## 2022-05-23 PROCEDURE — 85025 COMPLETE CBC W/AUTO DIFF WBC: CPT

## 2022-05-23 PROCEDURE — 36415 COLL VENOUS BLD VENIPUNCTURE: CPT

## 2022-05-23 PROCEDURE — 80053 COMPREHEN METABOLIC PANEL: CPT

## 2022-08-01 ENCOUNTER — HOSPITAL ENCOUNTER (OUTPATIENT)
Age: 67
End: 2022-08-01
Payer: SELF-PAY

## 2022-08-01 DIAGNOSIS — M79.605: ICD-10-CM

## 2022-08-01 DIAGNOSIS — I65.23: ICD-10-CM

## 2022-08-01 DIAGNOSIS — M79.604: ICD-10-CM

## 2022-08-01 DIAGNOSIS — R92.8: Primary | ICD-10-CM

## 2022-08-01 PROCEDURE — 77061 BREAST TOMOSYNTHESIS UNI: CPT

## 2022-08-01 PROCEDURE — 77065 DX MAMMO INCL CAD UNI: CPT

## 2022-08-01 PROCEDURE — 93923 UPR/LXTR ART STDY 3+ LVLS: CPT

## 2022-08-01 PROCEDURE — G0279 TOMOSYNTHESIS, MAMMO: HCPCS

## 2022-08-29 NOTE — TELEPHONE ENCOUNTER
TAVR APRN    Contacted patient with information and instructions for CTA TAVR protocol and carotid doppler scheduled to follow TTE.  Testing will be done 5/6/21.  Arrive 11 AM.  NPO after 10 am.  No caffeine.  Hold metformin 5/5 and 5/6.  CTA first at noon.  Carotid after around 4:30 pm.  [facility] staff will assist to get her directed to Cardiovascular lab.    Debi FOX   29-Aug-2022 21:15

## 2022-10-06 ENCOUNTER — HOSPITAL ENCOUNTER (OUTPATIENT)
Age: 67
End: 2022-10-06
Payer: SELF-PAY

## 2022-10-06 DIAGNOSIS — M25.552: Primary | ICD-10-CM

## 2022-10-06 PROCEDURE — 73502 X-RAY EXAM HIP UNI 2-3 VIEWS: CPT

## 2022-11-11 ENCOUNTER — HOSPITAL ENCOUNTER (OUTPATIENT)
Dept: HOSPITAL 22 - PT | Age: 67
Discharge: HOME | End: 2022-11-11
Payer: MEDICARE

## 2022-11-11 DIAGNOSIS — R29.898: Primary | ICD-10-CM

## 2022-11-11 PROCEDURE — 97014 ELECTRIC STIMULATION THERAPY: CPT

## 2022-11-11 PROCEDURE — 97010 HOT OR COLD PACKS THERAPY: CPT

## 2022-11-11 PROCEDURE — G0283 ELEC STIM OTHER THAN WOUND: HCPCS

## 2022-11-11 PROCEDURE — 97163 PT EVAL HIGH COMPLEX 45 MIN: CPT

## 2022-11-11 PROCEDURE — 97164 PT RE-EVAL EST PLAN CARE: CPT

## 2022-11-11 PROCEDURE — 97110 THERAPEUTIC EXERCISES: CPT

## 2022-11-11 PROCEDURE — 97112 NEUROMUSCULAR REEDUCATION: CPT

## 2023-01-27 ENCOUNTER — HOSPITAL ENCOUNTER (OUTPATIENT)
Age: 68
End: 2023-01-27
Payer: MEDICARE

## 2023-01-27 DIAGNOSIS — I25.10: ICD-10-CM

## 2023-01-27 DIAGNOSIS — I65.23: ICD-10-CM

## 2023-01-27 DIAGNOSIS — I10: ICD-10-CM

## 2023-01-27 DIAGNOSIS — Z95.2: ICD-10-CM

## 2023-01-27 DIAGNOSIS — I27.20: ICD-10-CM

## 2023-01-27 DIAGNOSIS — E78.2: Primary | ICD-10-CM

## 2023-01-27 LAB
ALBUMIN LEVEL: 4.1 G/DL (ref 3.5–5)
ALP ISO SERPL-ACNC: 145 U/L (ref 38–126)
ALT SERPLBLD-CCNC: 27 U/L (ref 12–78)
ANION GAP SERPL CALC-SCNC: 16.4 MEQ/L (ref 5–15)
AST SERPL QL: 35 U/L (ref 14–36)
BILIRUB DIRECT SERPL-MCNC: 0.2 MG/DL (ref 0–0.4)
BILIRUB INDIRECT SERPL-MCNC: 0.4 MG/DL (ref 0–1.1)
BILIRUB INDIRECT SERPL-MCNC: 0.5 MG/DL (ref 0–0.9)
BILIRUBIN,TOTAL: 0.7 MG/DL (ref 0.2–1.3)
BUN SERPL-MCNC: 10 MG/DL (ref 7–17)
CALCIUM SPEC-MCNC: 9.1 MG/DL (ref 8.4–10.2)
CHLORIDE SPEC-SCNC: 106 MMOL/L (ref 98–107)
CHOLEST SPEC-SCNC: 145 MG/DL (ref 140–200)
CO2 SERPL-SCNC: 24 MMOL/L (ref 22–30)
CREAT BLD-SCNC: 0.9 MG/DL (ref 0.52–1.04)
ESTIMATED GLOMERULAR FILT RATE: 63 ML/MIN (ref 60–?)
GFR (AFRICAN AMERICAN): 76 ML/MIN (ref 60–?)
GLUCOSE: 82 MG/DL (ref 74–100)
HCT VFR BLD CALC: 47.6 % (ref 37–47)
HDLC SERPL-MCNC: 52 MG/DL (ref 40–60)
HGB BLD-MCNC: 15.1 G/DL (ref 12.2–16.2)
MAGNESIUM: 1.9 MG/DL (ref 1.6–2.3)
MCHC RBC-ENTMCNC: 31.6 G/DL (ref 31.8–35.4)
MCV RBC: 89 FL (ref 81–99)
MEAN CORPUSCULAR HEMOGLOBIN: 28.1 PG (ref 27–31.2)
PLATELET # BLD: 331 K/MM3 (ref 142–424)
POTASSIUM: 4.4 MMOL/L (ref 3.5–5.1)
PROT SERPL-MCNC: 6.8 G/DL (ref 6.3–8.2)
RBC # BLD AUTO: 5.35 M/MM3 (ref 4.2–5.4)
SODIUM SPEC-SCNC: 142 MMOL/L (ref 136–145)
T4 FREE SERPL-MCNC: 0.6 NG/DL (ref 0.78–2.19)
TRIGLYCERIDES: 147 MG/DL (ref 30–150)
TSH SERPL-ACNC: 16.4 UIU/ML (ref 0.47–4.68)
WBC # BLD AUTO: 7.9 K/MM3 (ref 4.8–10.8)

## 2023-01-27 PROCEDURE — 36415 COLL VENOUS BLD VENIPUNCTURE: CPT

## 2023-01-27 PROCEDURE — 85025 COMPLETE CBC W/AUTO DIFF WBC: CPT

## 2023-01-27 PROCEDURE — 84443 ASSAY THYROID STIM HORMONE: CPT

## 2023-01-27 PROCEDURE — 84439 ASSAY OF FREE THYROXINE: CPT

## 2023-01-27 PROCEDURE — 83735 ASSAY OF MAGNESIUM: CPT

## 2023-01-27 PROCEDURE — 80061 LIPID PANEL: CPT

## 2023-01-27 PROCEDURE — 80048 BASIC METABOLIC PNL TOTAL CA: CPT

## 2023-01-27 PROCEDURE — 80076 HEPATIC FUNCTION PANEL: CPT

## 2023-08-16 ENCOUNTER — HOSPITAL ENCOUNTER (OUTPATIENT)
Dept: HOSPITAL 22 - PT | Age: 68
Discharge: HOME | End: 2023-08-16
Payer: MEDICARE

## 2023-08-16 DIAGNOSIS — R53.81: Primary | ICD-10-CM

## 2023-08-16 PROCEDURE — 97535 SELF CARE MNGMENT TRAINING: CPT

## 2023-08-16 PROCEDURE — G0283 ELEC STIM OTHER THAN WOUND: HCPCS

## 2023-08-16 PROCEDURE — 97110 THERAPEUTIC EXERCISES: CPT

## 2023-08-16 PROCEDURE — 97164 PT RE-EVAL EST PLAN CARE: CPT

## 2023-08-16 PROCEDURE — 97530 THERAPEUTIC ACTIVITIES: CPT

## 2023-08-16 PROCEDURE — 97163 PT EVAL HIGH COMPLEX 45 MIN: CPT

## 2023-08-16 PROCEDURE — 97014 ELECTRIC STIMULATION THERAPY: CPT

## 2023-08-16 PROCEDURE — 97010 HOT OR COLD PACKS THERAPY: CPT

## 2023-09-01 ENCOUNTER — HOSPITAL ENCOUNTER (OUTPATIENT)
Age: 68
End: 2023-09-01
Payer: MEDICARE

## 2023-09-01 DIAGNOSIS — Z12.31: Primary | ICD-10-CM

## 2023-09-01 PROCEDURE — 77063 BREAST TOMOSYNTHESIS BI: CPT

## 2023-09-01 PROCEDURE — 77067 SCR MAMMO BI INCL CAD: CPT

## 2023-09-10 ENCOUNTER — HOSPITAL ENCOUNTER (OUTPATIENT)
Age: 68
End: 2023-09-10
Payer: MEDICARE

## 2023-09-10 DIAGNOSIS — I10: Primary | ICD-10-CM

## 2023-09-10 PROCEDURE — 36415 COLL VENOUS BLD VENIPUNCTURE: CPT

## 2023-09-10 PROCEDURE — 80048 BASIC METABOLIC PNL TOTAL CA: CPT

## 2023-09-15 ENCOUNTER — HOSPITAL ENCOUNTER (OUTPATIENT)
Age: 68
End: 2023-09-15
Payer: MEDICARE

## 2023-09-15 DIAGNOSIS — E78.5: Primary | ICD-10-CM

## 2023-09-15 DIAGNOSIS — I65.29: ICD-10-CM

## 2023-09-15 DIAGNOSIS — I27.20: ICD-10-CM

## 2023-09-15 DIAGNOSIS — I25.10: ICD-10-CM

## 2023-09-15 DIAGNOSIS — I10: ICD-10-CM

## 2023-09-15 DIAGNOSIS — Z95.2: ICD-10-CM

## 2023-09-15 LAB
ANION GAP SERPL CALC-SCNC: 18.5 MEQ/L (ref 5–15)
BUN SERPL-MCNC: 14 MG/DL (ref 7–17)
CALCIUM SPEC-MCNC: 8.3 MG/DL (ref 8.4–10.2)
CHLORIDE SPEC-SCNC: 97 MMOL/L (ref 98–107)
CO2 SERPL-SCNC: 27 MMOL/L (ref 22–30)
CREAT BLD-SCNC: 1.1 MG/DL (ref 0.52–1.04)
ESTIMATED GLOMERULAR FILT RATE: 50 ML/MIN (ref 60–?)
GFR (AFRICAN AMERICAN): 60 ML/MIN (ref 60–?)
GLUCOSE: 115 MG/DL (ref 74–100)
POTASSIUM: 3.5 MMOL/L (ref 3.5–5.1)
SODIUM SPEC-SCNC: 139 MMOL/L (ref 136–145)

## 2023-09-15 PROCEDURE — 80048 BASIC METABOLIC PNL TOTAL CA: CPT

## 2023-09-15 PROCEDURE — 36415 COLL VENOUS BLD VENIPUNCTURE: CPT

## 2023-09-15 PROCEDURE — 93306 TTE W/DOPPLER COMPLETE: CPT

## 2023-09-20 ENCOUNTER — HOSPITAL ENCOUNTER (OUTPATIENT)
Age: 68
End: 2023-09-20
Payer: MEDICARE

## 2023-09-20 DIAGNOSIS — E78.5: Primary | ICD-10-CM

## 2023-09-20 DIAGNOSIS — I20.8: ICD-10-CM

## 2023-09-20 DIAGNOSIS — I27.20: ICD-10-CM

## 2023-09-20 DIAGNOSIS — R06.00: ICD-10-CM

## 2023-09-20 DIAGNOSIS — I10: ICD-10-CM

## 2023-09-20 LAB
ANION GAP SERPL CALC-SCNC: 16.2 MEQ/L (ref 5–15)
BUN SERPL-MCNC: 10 MG/DL (ref 7–17)
CALCIUM SPEC-MCNC: 8.3 MG/DL (ref 8.4–10.2)
CHLORIDE SPEC-SCNC: 98 MMOL/L (ref 98–107)
CO2 SERPL-SCNC: 28 MMOL/L (ref 22–30)
CREAT BLD-SCNC: 1.1 MG/DL (ref 0.52–1.04)
ESTIMATED GLOMERULAR FILT RATE: 50 ML/MIN (ref 60–?)
GFR (AFRICAN AMERICAN): 60 ML/MIN (ref 60–?)
GLUCOSE: 100 MG/DL (ref 74–100)
HCT VFR BLD CALC: 46.5 % (ref 37–47)
HGB BLD-MCNC: 14.8 G/DL (ref 12.2–16.2)
MCHC RBC-ENTMCNC: 31.7 G/DL (ref 31.8–35.4)
MCV RBC: 87.1 FL (ref 81–99)
MEAN CORPUSCULAR HEMOGLOBIN: 27.7 PG (ref 27–31.2)
PLATELET # BLD: 335 K/MM3 (ref 142–424)
POTASSIUM: 3.2 MMOL/L (ref 3.5–5.1)
RBC # BLD AUTO: 5.34 M/MM3 (ref 4.2–5.4)
SODIUM SPEC-SCNC: 139 MMOL/L (ref 136–145)
WBC # BLD AUTO: 7.8 K/MM3 (ref 4.8–10.8)

## 2023-09-20 PROCEDURE — 85025 COMPLETE CBC W/AUTO DIFF WBC: CPT

## 2023-09-20 PROCEDURE — 80048 BASIC METABOLIC PNL TOTAL CA: CPT

## 2023-09-20 PROCEDURE — 36415 COLL VENOUS BLD VENIPUNCTURE: CPT

## 2023-09-25 ENCOUNTER — HOSPITAL ENCOUNTER (OUTPATIENT)
Age: 68
End: 2023-09-25
Payer: MEDICARE

## 2023-09-25 DIAGNOSIS — I20.8: ICD-10-CM

## 2023-09-25 DIAGNOSIS — R06.00: ICD-10-CM

## 2023-09-25 DIAGNOSIS — I27.20: ICD-10-CM

## 2023-09-25 DIAGNOSIS — M85.89: ICD-10-CM

## 2023-09-25 DIAGNOSIS — I10: ICD-10-CM

## 2023-09-25 DIAGNOSIS — E78.5: Primary | ICD-10-CM

## 2023-09-25 PROCEDURE — A9502 TC99M TETROFOSMIN: HCPCS

## 2023-09-25 PROCEDURE — 93017 CV STRESS TEST TRACING ONLY: CPT

## 2023-09-25 PROCEDURE — 77080 DXA BONE DENSITY AXIAL: CPT

## 2023-09-25 PROCEDURE — 78452 HT MUSCLE IMAGE SPECT MULT: CPT

## 2023-10-12 ENCOUNTER — HOSPITAL ENCOUNTER (INPATIENT)
Dept: HOSPITAL 22 - ER | Age: 68
LOS: 2 days | Discharge: HOME | DRG: 641 | End: 2023-10-14
Payer: MEDICARE

## 2023-10-12 VITALS
TEMPERATURE: 97.7 F | HEART RATE: 77 BPM | RESPIRATION RATE: 18 BRPM | OXYGEN SATURATION: 97 % | SYSTOLIC BLOOD PRESSURE: 108 MMHG | DIASTOLIC BLOOD PRESSURE: 46 MMHG

## 2023-10-12 VITALS
DIASTOLIC BLOOD PRESSURE: 57 MMHG | OXYGEN SATURATION: 98 % | TEMPERATURE: 98.42 F | SYSTOLIC BLOOD PRESSURE: 106 MMHG | HEART RATE: 81 BPM | RESPIRATION RATE: 18 BRPM

## 2023-10-12 VITALS
SYSTOLIC BLOOD PRESSURE: 123 MMHG | TEMPERATURE: 98.06 F | OXYGEN SATURATION: 95 % | HEART RATE: 73 BPM | DIASTOLIC BLOOD PRESSURE: 93 MMHG | RESPIRATION RATE: 18 BRPM

## 2023-10-12 VITALS
TEMPERATURE: 98.24 F | OXYGEN SATURATION: 98 % | RESPIRATION RATE: 19 BRPM | SYSTOLIC BLOOD PRESSURE: 123 MMHG | HEART RATE: 71 BPM | DIASTOLIC BLOOD PRESSURE: 93 MMHG

## 2023-10-12 VITALS — BODY MASS INDEX: 51.8 KG/M2 | BODY MASS INDEX: 50.5 KG/M2 | BODY MASS INDEX: 51.6 KG/M2 | BODY MASS INDEX: 50.4 KG/M2

## 2023-10-12 DIAGNOSIS — R94.39: ICD-10-CM

## 2023-10-12 DIAGNOSIS — E78.2: ICD-10-CM

## 2023-10-12 DIAGNOSIS — E78.5: ICD-10-CM

## 2023-10-12 DIAGNOSIS — Z95.2: ICD-10-CM

## 2023-10-12 DIAGNOSIS — B37.0: ICD-10-CM

## 2023-10-12 DIAGNOSIS — Z86.73: ICD-10-CM

## 2023-10-12 DIAGNOSIS — F32.9: ICD-10-CM

## 2023-10-12 DIAGNOSIS — I27.20: ICD-10-CM

## 2023-10-12 DIAGNOSIS — I10: ICD-10-CM

## 2023-10-12 DIAGNOSIS — E11.9: ICD-10-CM

## 2023-10-12 DIAGNOSIS — N17.9: ICD-10-CM

## 2023-10-12 DIAGNOSIS — E83.42: ICD-10-CM

## 2023-10-12 DIAGNOSIS — I35.0: ICD-10-CM

## 2023-10-12 DIAGNOSIS — R07.9: ICD-10-CM

## 2023-10-12 DIAGNOSIS — I25.10: ICD-10-CM

## 2023-10-12 DIAGNOSIS — E87.6: Primary | ICD-10-CM

## 2023-10-12 LAB
ALBUMIN LEVEL: 4.2 G/DL (ref 3.5–5)
ALBUMIN/GLOB SERPL: 1.3 {RATIO} (ref 1.1–1.8)
ALP ISO SERPL-ACNC: 156 U/L (ref 38–126)
ALT SERPLBLD-CCNC: 40 U/L (ref 12–78)
ANION GAP SERPL CALC-SCNC: 16.8 MEQ/L (ref 5–15)
AST SERPL QL: 66 U/L (ref 14–36)
BILIRUBIN,TOTAL: 0.7 MG/DL (ref 0.2–1.3)
BUN SERPL-MCNC: 15 MG/DL (ref 7–17)
CALCIUM SPEC-MCNC: 8 MG/DL (ref 8.4–10.2)
CHLORIDE SPEC-SCNC: 91 MMOL/L (ref 98–107)
CO2 SERPL-SCNC: 28 MMOL/L (ref 22–30)
CREAT BLD-SCNC: 1.1 MG/DL (ref 0.52–1.04)
CREATININE CLEARANCE ESTIMATED: 34 ML/MIN (ref 50–200)
ESTIMATED GLOMERULAR FILT RATE: 50 ML/MIN (ref 60–?)
GFR (AFRICAN AMERICAN): 60 ML/MIN (ref 60–?)
GLOBULIN SER CALC-MCNC: 3.2 G/DL (ref 1.3–3.2)
GLUCOSE: 116 MG/DL (ref 74–100)
HCT VFR BLD CALC: 41.3 % (ref 37–47)
HGB BLD-MCNC: 14.4 G/DL (ref 12.2–16.2)
MAGNESIUM: 1.2 MG/DL (ref 1.6–2.3)
MCHC RBC-ENTMCNC: 34.8 G/DL (ref 31.8–35.4)
MCV RBC: 85.3 FL (ref 81–99)
MEAN CORPUSCULAR HEMOGLOBIN: 29.6 PG (ref 27–31.2)
NT PRO BRAIN NATRIURETIC PEP.: 510 PG/ML (ref 0–125)
PLATELET # BLD: 352 K/MM3 (ref 142–424)
POTASSIUM: 2.8 MMOL/L (ref 3.5–5.1)
PROT SERPL-MCNC: 7.4 G/DL (ref 6.3–8.2)
RBC # BLD AUTO: 4.84 M/MM3 (ref 4.2–5.4)
SODIUM SPEC-SCNC: 133 MMOL/L (ref 136–145)
TROPONIN I: 0.02 NG/ML (ref 0–0.03)
TROPONIN I: 0.02 NG/ML (ref 0–0.03)
WBC # BLD AUTO: 8.4 K/MM3 (ref 4.8–10.8)

## 2023-10-12 PROCEDURE — 71045 X-RAY EXAM CHEST 1 VIEW: CPT

## 2023-10-12 PROCEDURE — 83880 ASSAY OF NATRIURETIC PEPTIDE: CPT

## 2023-10-12 PROCEDURE — 80053 COMPREHEN METABOLIC PANEL: CPT

## 2023-10-12 PROCEDURE — 93005 ELECTROCARDIOGRAM TRACING: CPT

## 2023-10-12 PROCEDURE — 85025 COMPLETE CBC W/AUTO DIFF WBC: CPT

## 2023-10-12 PROCEDURE — 99285 EMERGENCY DEPT VISIT HI MDM: CPT

## 2023-10-12 PROCEDURE — 84484 ASSAY OF TROPONIN QUANT: CPT

## 2023-10-12 PROCEDURE — 84443 ASSAY THYROID STIM HORMONE: CPT

## 2023-10-12 PROCEDURE — 84439 ASSAY OF FREE THYROXINE: CPT

## 2023-10-12 PROCEDURE — 80048 BASIC METABOLIC PNL TOTAL CA: CPT

## 2023-10-12 PROCEDURE — 94640 AIRWAY INHALATION TREATMENT: CPT

## 2023-10-12 PROCEDURE — 83735 ASSAY OF MAGNESIUM: CPT

## 2023-10-12 PROCEDURE — 36415 COLL VENOUS BLD VENIPUNCTURE: CPT

## 2023-10-13 VITALS
OXYGEN SATURATION: 97 % | SYSTOLIC BLOOD PRESSURE: 137 MMHG | RESPIRATION RATE: 18 BRPM | TEMPERATURE: 98.06 F | DIASTOLIC BLOOD PRESSURE: 71 MMHG | HEART RATE: 91 BPM

## 2023-10-13 VITALS — OXYGEN SATURATION: 97 % | HEART RATE: 80 BPM

## 2023-10-13 VITALS
DIASTOLIC BLOOD PRESSURE: 79 MMHG | SYSTOLIC BLOOD PRESSURE: 145 MMHG | RESPIRATION RATE: 18 BRPM | HEART RATE: 80 BPM | OXYGEN SATURATION: 96 % | TEMPERATURE: 97.7 F

## 2023-10-13 VITALS
TEMPERATURE: 98.2 F | SYSTOLIC BLOOD PRESSURE: 129 MMHG | DIASTOLIC BLOOD PRESSURE: 67 MMHG | HEART RATE: 78 BPM | OXYGEN SATURATION: 96 % | RESPIRATION RATE: 17 BRPM

## 2023-10-13 VITALS — HEART RATE: 70 BPM

## 2023-10-13 VITALS
SYSTOLIC BLOOD PRESSURE: 126 MMHG | DIASTOLIC BLOOD PRESSURE: 101 MMHG | TEMPERATURE: 98.6 F | RESPIRATION RATE: 18 BRPM | OXYGEN SATURATION: 98 % | HEART RATE: 50 BPM

## 2023-10-13 VITALS
HEART RATE: 78 BPM | RESPIRATION RATE: 17 BRPM | SYSTOLIC BLOOD PRESSURE: 113 MMHG | DIASTOLIC BLOOD PRESSURE: 61 MMHG | OXYGEN SATURATION: 95 % | TEMPERATURE: 98 F

## 2023-10-13 VITALS
TEMPERATURE: 98.2 F | DIASTOLIC BLOOD PRESSURE: 45 MMHG | HEART RATE: 76 BPM | RESPIRATION RATE: 18 BRPM | OXYGEN SATURATION: 96 % | SYSTOLIC BLOOD PRESSURE: 99 MMHG

## 2023-10-13 VITALS — SYSTOLIC BLOOD PRESSURE: 135 MMHG | HEART RATE: 62 BPM | DIASTOLIC BLOOD PRESSURE: 88 MMHG

## 2023-10-13 LAB
ANION GAP SERPL CALC-SCNC: 13.2 MEQ/L (ref 5–15)
BUN SERPL-MCNC: 15 MG/DL (ref 7–17)
CALCIUM SPEC-MCNC: 7.8 MG/DL (ref 8.4–10.2)
CHLORIDE SPEC-SCNC: 94 MMOL/L (ref 98–107)
CO2 SERPL-SCNC: 29 MMOL/L (ref 22–30)
CREAT BLD-SCNC: 1.2 MG/DL (ref 0.52–1.04)
CREATININE CLEARANCE ESTIMATED: 31 ML/MIN (ref 50–200)
ESTIMATED GLOMERULAR FILT RATE: 45 ML/MIN (ref 60–?)
GFR (AFRICAN AMERICAN): 54 ML/MIN (ref 60–?)
GLUCOSE: 130 MG/DL (ref 74–100)
MAGNESIUM: 1.2 MG/DL (ref 1.6–2.3)
NT PRO BRAIN NATRIURETIC PEP.: 417 PG/ML (ref 0–125)
POTASSIUM: 3.2 MMOL/L (ref 3.5–5.1)
SODIUM SPEC-SCNC: 133 MMOL/L (ref 136–145)
T4 FREE SERPL-MCNC: 1.37 NG/DL (ref 0.78–2.19)
TROPONIN I: 0.02 NG/ML (ref 0–0.03)
TSH SERPL-ACNC: 0.47 UIU/ML (ref 0.47–4.68)

## 2023-10-14 VITALS
RESPIRATION RATE: 16 BRPM | DIASTOLIC BLOOD PRESSURE: 82 MMHG | OXYGEN SATURATION: 98 % | TEMPERATURE: 98.24 F | SYSTOLIC BLOOD PRESSURE: 158 MMHG | HEART RATE: 89 BPM

## 2023-10-14 VITALS — HEART RATE: 90 BPM

## 2023-10-14 VITALS
DIASTOLIC BLOOD PRESSURE: 73 MMHG | SYSTOLIC BLOOD PRESSURE: 137 MMHG | TEMPERATURE: 98.42 F | HEART RATE: 90 BPM | RESPIRATION RATE: 19 BRPM | OXYGEN SATURATION: 95 %

## 2023-10-14 VITALS — HEART RATE: 95 BPM | OXYGEN SATURATION: 96 %

## 2023-10-14 VITALS — OXYGEN SATURATION: 97 %

## 2023-10-14 LAB
ANION GAP SERPL CALC-SCNC: 16.2 MEQ/L (ref 5–15)
BUN SERPL-MCNC: 10 MG/DL (ref 7–17)
CALCIUM SPEC-MCNC: 8 MG/DL (ref 8.4–10.2)
CHLORIDE SPEC-SCNC: 95 MMOL/L (ref 98–107)
CO2 SERPL-SCNC: 25 MMOL/L (ref 22–30)
CREAT BLD-SCNC: 0.8 MG/DL (ref 0.52–1.04)
CREATININE CLEARANCE ESTIMATED: 37 ML/MIN (ref 50–200)
ESTIMATED GLOMERULAR FILT RATE: 72 ML/MIN (ref 60–?)
GFR (AFRICAN AMERICAN): 87 ML/MIN (ref 60–?)
GLUCOSE: 193 MG/DL (ref 74–100)
MAGNESIUM: 1.9 MG/DL (ref 1.6–2.3)
POTASSIUM: 4.2 MMOL/L (ref 3.5–5.1)
SODIUM SPEC-SCNC: 132 MMOL/L (ref 136–145)

## 2023-10-27 ENCOUNTER — HOSPITAL ENCOUNTER (OUTPATIENT)
Age: 68
Discharge: HOME | End: 2023-10-27
Payer: MEDICARE

## 2023-10-27 VITALS
OXYGEN SATURATION: 94 % | RESPIRATION RATE: 18 BRPM | HEART RATE: 85 BPM | SYSTOLIC BLOOD PRESSURE: 139 MMHG | DIASTOLIC BLOOD PRESSURE: 77 MMHG

## 2023-10-27 VITALS
OXYGEN SATURATION: 96 % | SYSTOLIC BLOOD PRESSURE: 140 MMHG | RESPIRATION RATE: 18 BRPM | HEART RATE: 81 BPM | DIASTOLIC BLOOD PRESSURE: 53 MMHG

## 2023-10-27 VITALS
OXYGEN SATURATION: 96 % | HEART RATE: 84 BPM | SYSTOLIC BLOOD PRESSURE: 123 MMHG | DIASTOLIC BLOOD PRESSURE: 74 MMHG | RESPIRATION RATE: 18 BRPM

## 2023-10-27 VITALS
OXYGEN SATURATION: 96 % | DIASTOLIC BLOOD PRESSURE: 72 MMHG | RESPIRATION RATE: 18 BRPM | HEART RATE: 85 BPM | SYSTOLIC BLOOD PRESSURE: 133 MMHG

## 2023-10-27 VITALS
SYSTOLIC BLOOD PRESSURE: 151 MMHG | HEART RATE: 80 BPM | RESPIRATION RATE: 18 BRPM | OXYGEN SATURATION: 99 % | DIASTOLIC BLOOD PRESSURE: 78 MMHG

## 2023-10-27 VITALS
HEART RATE: 86 BPM | SYSTOLIC BLOOD PRESSURE: 131 MMHG | RESPIRATION RATE: 18 BRPM | OXYGEN SATURATION: 96 % | DIASTOLIC BLOOD PRESSURE: 80 MMHG

## 2023-10-27 VITALS
OXYGEN SATURATION: 97 % | RESPIRATION RATE: 18 BRPM | DIASTOLIC BLOOD PRESSURE: 77 MMHG | SYSTOLIC BLOOD PRESSURE: 133 MMHG | HEART RATE: 81 BPM

## 2023-10-27 VITALS
HEART RATE: 104 BPM | OXYGEN SATURATION: 96 % | RESPIRATION RATE: 18 BRPM | SYSTOLIC BLOOD PRESSURE: 133 MMHG | DIASTOLIC BLOOD PRESSURE: 86 MMHG

## 2023-10-27 VITALS
DIASTOLIC BLOOD PRESSURE: 99 MMHG | SYSTOLIC BLOOD PRESSURE: 161 MMHG | HEART RATE: 84 BPM | TEMPERATURE: 98.6 F | RESPIRATION RATE: 18 BRPM | OXYGEN SATURATION: 95 %

## 2023-10-27 VITALS — BODY MASS INDEX: 52.1 KG/M2

## 2023-10-27 VITALS
SYSTOLIC BLOOD PRESSURE: 134 MMHG | HEART RATE: 81 BPM | OXYGEN SATURATION: 95 % | RESPIRATION RATE: 18 BRPM | DIASTOLIC BLOOD PRESSURE: 72 MMHG

## 2023-10-27 DIAGNOSIS — Z95.2: ICD-10-CM

## 2023-10-27 DIAGNOSIS — I25.118: ICD-10-CM

## 2023-10-27 DIAGNOSIS — Z79.899: ICD-10-CM

## 2023-10-27 DIAGNOSIS — I10: ICD-10-CM

## 2023-10-27 DIAGNOSIS — E66.01: ICD-10-CM

## 2023-10-27 DIAGNOSIS — I27.20: ICD-10-CM

## 2023-10-27 DIAGNOSIS — E11.9: ICD-10-CM

## 2023-10-27 LAB
ANION GAP SERPL CALC-SCNC: 11.6 MEQ/L (ref 5–15)
BUN SERPL-MCNC: 9 MG/DL (ref 7–17)
CALCIUM SPEC-MCNC: 8.8 MG/DL (ref 8.4–10.2)
CATHL ACTIVATED CLOTTING TIME: > 400 SEC (ref 74–125)
CHLORIDE SPEC-SCNC: 106 MMOL/L (ref 98–107)
CO2 SERPL-SCNC: 26 MMOL/L (ref 22–30)
CREAT BLD-SCNC: 0.9 MG/DL (ref 0.52–1.04)
CREATININE CLEARANCE ESTIMATED: 37 ML/MIN (ref 50–200)
ESTIMATED GLOMERULAR FILT RATE: 62 ML/MIN (ref 60–?)
GFR (AFRICAN AMERICAN): 76 ML/MIN (ref 60–?)
GLUCOSE: 128 MG/DL (ref 74–100)
HCT VFR BLD CALC: 41.2 % (ref 37–47)
HGB BLD-MCNC: 13.7 G/DL (ref 12.2–16.2)
MCHC RBC-ENTMCNC: 33.3 G/DL (ref 31.8–35.4)
MCV RBC: 90.5 FL (ref 81–99)
MEAN CORPUSCULAR HEMOGLOBIN: 30.1 PG (ref 27–31.2)
PLATELET # BLD: 316 K/MM3 (ref 142–424)
POTASSIUM: 3.6 MMOL/L (ref 3.5–5.1)
RBC # BLD AUTO: 4.55 M/MM3 (ref 4.2–5.4)
SODIUM SPEC-SCNC: 140 MMOL/L (ref 136–145)
WBC # BLD AUTO: 8 K/MM3 (ref 4.8–10.8)

## 2023-10-27 PROCEDURE — C1725 CATH, TRANSLUMIN NON-LASER: HCPCS

## 2023-10-27 PROCEDURE — 99153 MOD SED SAME PHYS/QHP EA: CPT

## 2023-10-27 PROCEDURE — 36415 COLL VENOUS BLD VENIPUNCTURE: CPT

## 2023-10-27 PROCEDURE — 92928 PRQ TCAT PLMT NTRAC ST 1 LES: CPT

## 2023-10-27 PROCEDURE — 92978 ENDOLUMINL IVUS OCT C 1ST: CPT

## 2023-10-27 PROCEDURE — C1876 STENT, NON-COA/NON-COV W/DEL: HCPCS

## 2023-10-27 PROCEDURE — C1894 INTRO/SHEATH, NON-LASER: HCPCS

## 2023-10-27 PROCEDURE — 85025 COMPLETE CBC W/AUTO DIFF WBC: CPT

## 2023-10-27 PROCEDURE — 85347 COAGULATION TIME ACTIVATED: CPT

## 2023-10-27 PROCEDURE — 93460 R&L HRT ART/VENTRICLE ANGIO: CPT

## 2023-10-27 PROCEDURE — C1760 CLOSURE DEV, VASC: HCPCS

## 2023-10-27 PROCEDURE — C9600 PERC DRUG-EL COR STENT SING: HCPCS

## 2023-10-27 PROCEDURE — 80048 BASIC METABOLIC PNL TOTAL CA: CPT

## 2023-10-27 PROCEDURE — C1769 GUIDE WIRE: HCPCS

## 2023-10-27 PROCEDURE — 99152 MOD SED SAME PHYS/QHP 5/>YRS: CPT

## 2023-11-06 ENCOUNTER — HOSPITAL ENCOUNTER (OUTPATIENT)
Dept: HOSPITAL 22 - PT | Age: 68
LOS: 53 days | Discharge: HOME | End: 2023-12-29
Payer: MEDICARE

## 2023-11-06 DIAGNOSIS — I25.10: Primary | ICD-10-CM

## 2023-11-06 DIAGNOSIS — Z95.5: ICD-10-CM

## 2023-11-06 PROCEDURE — 93798 PHYS/QHP OP CAR RHAB W/ECG: CPT

## 2023-12-11 ENCOUNTER — HOSPITAL ENCOUNTER (EMERGENCY)
Age: 68
LOS: 1 days | Discharge: HOME | End: 2023-12-12
Payer: MEDICARE

## 2023-12-11 VITALS — OXYGEN SATURATION: 98 % | DIASTOLIC BLOOD PRESSURE: 79 MMHG | SYSTOLIC BLOOD PRESSURE: 128 MMHG | HEART RATE: 85 BPM

## 2023-12-11 VITALS
DIASTOLIC BLOOD PRESSURE: 60 MMHG | RESPIRATION RATE: 10 BRPM | SYSTOLIC BLOOD PRESSURE: 111 MMHG | HEART RATE: 79 BPM | OXYGEN SATURATION: 100 %

## 2023-12-11 VITALS — HEART RATE: 92 BPM | SYSTOLIC BLOOD PRESSURE: 120 MMHG | OXYGEN SATURATION: 97 % | DIASTOLIC BLOOD PRESSURE: 48 MMHG

## 2023-12-11 VITALS — BODY MASS INDEX: 49.6 KG/M2

## 2023-12-11 VITALS — OXYGEN SATURATION: 98 % | DIASTOLIC BLOOD PRESSURE: 79 MMHG | HEART RATE: 88 BPM | SYSTOLIC BLOOD PRESSURE: 148 MMHG

## 2023-12-11 VITALS
SYSTOLIC BLOOD PRESSURE: 127 MMHG | RESPIRATION RATE: 10 BRPM | DIASTOLIC BLOOD PRESSURE: 81 MMHG | OXYGEN SATURATION: 96 % | HEART RATE: 85 BPM

## 2023-12-11 VITALS
RESPIRATION RATE: 16 BRPM | OXYGEN SATURATION: 98 % | DIASTOLIC BLOOD PRESSURE: 85 MMHG | SYSTOLIC BLOOD PRESSURE: 133 MMHG | HEART RATE: 100 BPM | TEMPERATURE: 98.24 F

## 2023-12-11 DIAGNOSIS — R11.0: ICD-10-CM

## 2023-12-11 DIAGNOSIS — I50.9: ICD-10-CM

## 2023-12-11 DIAGNOSIS — E86.0: Primary | ICD-10-CM

## 2023-12-11 DIAGNOSIS — I25.118: ICD-10-CM

## 2023-12-11 DIAGNOSIS — E87.6: ICD-10-CM

## 2023-12-11 DIAGNOSIS — E78.5: ICD-10-CM

## 2023-12-11 DIAGNOSIS — E83.42: ICD-10-CM

## 2023-12-11 DIAGNOSIS — I27.20: ICD-10-CM

## 2023-12-11 DIAGNOSIS — E11.9: ICD-10-CM

## 2023-12-11 DIAGNOSIS — I11.0: ICD-10-CM

## 2023-12-11 LAB
ALBUMIN LEVEL: 4 G/DL (ref 3.5–5)
ALBUMIN/GLOB SERPL: 1.3 {RATIO} (ref 1.1–1.8)
ALP ISO SERPL-ACNC: 170 U/L (ref 38–126)
ALT SERPLBLD-CCNC: 34 U/L (ref 12–78)
ANION GAP SERPL CALC-SCNC: 15 MEQ/L (ref 5–15)
AST SERPL QL: 36 U/L (ref 14–36)
BILIRUB UR STRIP-MCNC: (no result) MG/DL
BILIRUBIN,TOTAL: 1 MG/DL (ref 0.2–1.3)
BUN SERPL-MCNC: 11 MG/DL (ref 7–17)
CALCIUM SPEC-MCNC: 8.5 MG/DL (ref 8.4–10.2)
CHLORIDE SPEC-SCNC: 97 MMOL/L (ref 98–107)
CK SERPL-CCNC: 92 U/L (ref 30–135)
CO2 SERPL-SCNC: 26 MMOL/L (ref 22–30)
COLOR UR: (no result)
CREAT BLD-SCNC: 0.8 MG/DL (ref 0.52–1.04)
CREATININE CLEARANCE ESTIMATED: 37 ML/MIN (ref 50–200)
ESTIMATED GLOMERULAR FILT RATE: 72 ML/MIN (ref 60–?)
GFR (AFRICAN AMERICAN): 87 ML/MIN (ref 60–?)
GLOBULIN SER CALC-MCNC: 3 G/DL (ref 1.3–3.2)
GLUCOSE: 115 MG/DL (ref 74–100)
HCT VFR BLD CALC: 41.5 % (ref 37–47)
HGB BLD-MCNC: 14 G/DL (ref 12.2–16.2)
KETONES UR STRIP.AUTO-MCNC: (no result) MG/DL
LEUKOCYTE ESTERASE UR QL STRIP: (no result)
LIPASE: 40 U/L (ref 23–300)
MAGNESIUM: 1.5 MG/DL (ref 1.6–2.3)
MCHC RBC-ENTMCNC: 33.6 G/DL (ref 31.8–35.4)
MCV RBC: 87.6 FL (ref 81–99)
MEAN CORPUSCULAR HEMOGLOBIN: 29.5 PG (ref 27–31.2)
MICRO URNS: (no result)
PH UR: 6.5 [PH] (ref 5–8.5)
PLATELET # BLD: 403 K/MM3 (ref 142–424)
POTASSIUM: 3 MMOL/L (ref 3.5–5.1)
PROT SERPL-MCNC: 7 G/DL (ref 6.3–8.2)
PROT UR STRIP-MCNC: (no result) MG/DL
RBC # BLD AUTO: 4.74 M/MM3 (ref 4.2–5.4)
SODIUM SPEC-SCNC: 135 MMOL/L (ref 136–145)
SP GR UR: >= 1.03 (ref 1–1.03)
UROBILINOGEN UR QL: 1 EU/DL
WBC # BLD AUTO: 10.2 K/MM3 (ref 4.8–10.8)

## 2023-12-11 PROCEDURE — 87636 SARSCOV2 & INF A&B AMP PRB: CPT

## 2023-12-11 PROCEDURE — 80053 COMPREHEN METABOLIC PANEL: CPT

## 2023-12-11 PROCEDURE — 83605 ASSAY OF LACTIC ACID: CPT

## 2023-12-11 PROCEDURE — 96365 THER/PROPH/DIAG IV INF INIT: CPT

## 2023-12-11 PROCEDURE — 96375 TX/PRO/DX INJ NEW DRUG ADDON: CPT

## 2023-12-11 PROCEDURE — 83735 ASSAY OF MAGNESIUM: CPT

## 2023-12-11 PROCEDURE — 81001 URINALYSIS AUTO W/SCOPE: CPT

## 2023-12-11 PROCEDURE — 96366 THER/PROPH/DIAG IV INF ADDON: CPT

## 2023-12-11 PROCEDURE — 74177 CT ABD & PELVIS W/CONTRAST: CPT

## 2023-12-11 PROCEDURE — 96361 HYDRATE IV INFUSION ADD-ON: CPT

## 2023-12-11 PROCEDURE — 82550 ASSAY OF CK (CPK): CPT

## 2023-12-11 PROCEDURE — 85025 COMPLETE CBC W/AUTO DIFF WBC: CPT

## 2023-12-11 PROCEDURE — 83690 ASSAY OF LIPASE: CPT

## 2023-12-11 PROCEDURE — 82009 KETONE BODYS QUAL: CPT

## 2023-12-11 PROCEDURE — 36415 COLL VENOUS BLD VENIPUNCTURE: CPT

## 2023-12-11 PROCEDURE — 99291 CRITICAL CARE FIRST HOUR: CPT

## 2023-12-11 PROCEDURE — 73552 X-RAY EXAM OF FEMUR 2/>: CPT

## 2023-12-12 VITALS
HEART RATE: 83 BPM | OXYGEN SATURATION: 97 % | SYSTOLIC BLOOD PRESSURE: 115 MMHG | TEMPERATURE: 98.3 F | DIASTOLIC BLOOD PRESSURE: 74 MMHG | RESPIRATION RATE: 12 BRPM

## 2023-12-12 VITALS
SYSTOLIC BLOOD PRESSURE: 135 MMHG | DIASTOLIC BLOOD PRESSURE: 79 MMHG | OXYGEN SATURATION: 100 % | RESPIRATION RATE: 11 BRPM | HEART RATE: 88 BPM

## 2023-12-12 VITALS
DIASTOLIC BLOOD PRESSURE: 53 MMHG | HEART RATE: 89 BPM | SYSTOLIC BLOOD PRESSURE: 108 MMHG | OXYGEN SATURATION: 99 % | RESPIRATION RATE: 14 BRPM

## 2023-12-12 VITALS
DIASTOLIC BLOOD PRESSURE: 71 MMHG | SYSTOLIC BLOOD PRESSURE: 137 MMHG | RESPIRATION RATE: 11 BRPM | HEART RATE: 83 BPM | OXYGEN SATURATION: 100 %

## 2023-12-12 LAB
LACTATE SERPL-MCNC: 1.5 MMOL/L (ref 0.7–2.1)
REFLEX LACTIC: (no result)

## 2023-12-13 ENCOUNTER — HOSPITAL ENCOUNTER (OUTPATIENT)
Age: 68
Discharge: HOME | End: 2023-12-13
Payer: MEDICARE

## 2023-12-13 VITALS
HEART RATE: 78 BPM | SYSTOLIC BLOOD PRESSURE: 126 MMHG | RESPIRATION RATE: 14 BRPM | DIASTOLIC BLOOD PRESSURE: 64 MMHG | OXYGEN SATURATION: 98 %

## 2023-12-13 VITALS
OXYGEN SATURATION: 94 % | RESPIRATION RATE: 14 BRPM | TEMPERATURE: 97.34 F | DIASTOLIC BLOOD PRESSURE: 61 MMHG | SYSTOLIC BLOOD PRESSURE: 91 MMHG | HEART RATE: 78 BPM

## 2023-12-13 VITALS
OXYGEN SATURATION: 95 % | RESPIRATION RATE: 22 BRPM | TEMPERATURE: 97.34 F | SYSTOLIC BLOOD PRESSURE: 91 MMHG | HEART RATE: 79 BPM | DIASTOLIC BLOOD PRESSURE: 61 MMHG

## 2023-12-13 VITALS
HEART RATE: 86 BPM | RESPIRATION RATE: 18 BRPM | TEMPERATURE: 96.98 F | SYSTOLIC BLOOD PRESSURE: 157 MMHG | DIASTOLIC BLOOD PRESSURE: 70 MMHG | OXYGEN SATURATION: 96 %

## 2023-12-13 VITALS
SYSTOLIC BLOOD PRESSURE: 121 MMHG | OXYGEN SATURATION: 94 % | DIASTOLIC BLOOD PRESSURE: 77 MMHG | HEART RATE: 82 BPM | RESPIRATION RATE: 14 BRPM

## 2023-12-13 VITALS
DIASTOLIC BLOOD PRESSURE: 79 MMHG | SYSTOLIC BLOOD PRESSURE: 113 MMHG | HEART RATE: 78 BPM | OXYGEN SATURATION: 95 % | RESPIRATION RATE: 14 BRPM

## 2023-12-13 VITALS — OXYGEN SATURATION: 96 %

## 2023-12-13 VITALS — BODY MASS INDEX: 52.1 KG/M2

## 2023-12-13 DIAGNOSIS — E11.9: ICD-10-CM

## 2023-12-13 DIAGNOSIS — K31.89: ICD-10-CM

## 2023-12-13 DIAGNOSIS — K29.70: ICD-10-CM

## 2023-12-13 DIAGNOSIS — R11.2: ICD-10-CM

## 2023-12-13 DIAGNOSIS — K44.9: ICD-10-CM

## 2023-12-13 DIAGNOSIS — K22.2: Primary | ICD-10-CM

## 2023-12-13 LAB
ANION GAP SERPL CALC-SCNC: 10.8 MEQ/L (ref 5–15)
BUN SERPL-MCNC: 12 MG/DL (ref 7–17)
CALCIUM SPEC-MCNC: 8.4 MG/DL (ref 8.4–10.2)
CHLORIDE SPEC-SCNC: 99 MMOL/L (ref 98–107)
CO2 SERPL-SCNC: 28 MMOL/L (ref 22–30)
CREAT BLD-SCNC: 0.7 MG/DL (ref 0.52–1.04)
CREATININE CLEARANCE ESTIMATED: 37 ML/MIN (ref 50–200)
ESTIMATED GLOMERULAR FILT RATE: 83 ML/MIN (ref 60–?)
GFR (AFRICAN AMERICAN): 101 ML/MIN (ref 60–?)
GLUCOSE BLDC GLUCOMTR-MCNC: 107 MG/DL (ref 70–110)
GLUCOSE: 112 MG/DL (ref 74–100)
POTASSIUM: 3.8 MMOL/L (ref 3.5–5.1)
SODIUM SPEC-SCNC: 134 MMOL/L (ref 136–145)

## 2023-12-13 PROCEDURE — 88305 TISSUE EXAM BY PATHOLOGIST: CPT

## 2023-12-13 PROCEDURE — 80048 BASIC METABOLIC PNL TOTAL CA: CPT

## 2023-12-13 PROCEDURE — 0DJ08ZZ INSPECTION OF UPPER INTESTINAL TRACT, VIA NATURAL OR ARTIFICIAL OPENING ENDOSCOPIC: ICD-10-PCS | Performed by: INTERNAL MEDICINE

## 2023-12-13 PROCEDURE — 82962 GLUCOSE BLOOD TEST: CPT

## 2023-12-13 PROCEDURE — 43249 ESOPH EGD DILATION <30 MM: CPT

## 2023-12-13 PROCEDURE — C1726 CATH, BAL DIL, NON-VASCULAR: HCPCS

## 2023-12-13 PROCEDURE — 43239 EGD BIOPSY SINGLE/MULTIPLE: CPT

## 2023-12-15 ENCOUNTER — HOSPITAL ENCOUNTER (OUTPATIENT)
Dept: HOSPITAL 22 - ER | Age: 68
Setting detail: OBSERVATION
LOS: 3 days | Discharge: NURSING, REHAB OR LONG-TERM CARE | End: 2023-12-18
Payer: MEDICARE

## 2023-12-15 VITALS
OXYGEN SATURATION: 99 % | DIASTOLIC BLOOD PRESSURE: 74 MMHG | HEART RATE: 82 BPM | SYSTOLIC BLOOD PRESSURE: 155 MMHG | TEMPERATURE: 98.3 F | RESPIRATION RATE: 17 BRPM

## 2023-12-15 VITALS
OXYGEN SATURATION: 95 % | DIASTOLIC BLOOD PRESSURE: 64 MMHG | HEART RATE: 79 BPM | RESPIRATION RATE: 18 BRPM | SYSTOLIC BLOOD PRESSURE: 126 MMHG

## 2023-12-15 VITALS
OXYGEN SATURATION: 96 % | DIASTOLIC BLOOD PRESSURE: 84 MMHG | RESPIRATION RATE: 18 BRPM | HEART RATE: 86 BPM | TEMPERATURE: 98.42 F | SYSTOLIC BLOOD PRESSURE: 126 MMHG

## 2023-12-15 VITALS
HEART RATE: 80 BPM | OXYGEN SATURATION: 97 % | RESPIRATION RATE: 18 BRPM | SYSTOLIC BLOOD PRESSURE: 152 MMHG | DIASTOLIC BLOOD PRESSURE: 64 MMHG

## 2023-12-15 VITALS
SYSTOLIC BLOOD PRESSURE: 126 MMHG | RESPIRATION RATE: 18 BRPM | HEART RATE: 86 BPM | DIASTOLIC BLOOD PRESSURE: 64 MMHG | OXYGEN SATURATION: 96 % | TEMPERATURE: 98.42 F

## 2023-12-15 VITALS
HEART RATE: 80 BPM | OXYGEN SATURATION: 97 % | SYSTOLIC BLOOD PRESSURE: 155 MMHG | DIASTOLIC BLOOD PRESSURE: 68 MMHG | RESPIRATION RATE: 18 BRPM

## 2023-12-15 VITALS — SYSTOLIC BLOOD PRESSURE: 132 MMHG | OXYGEN SATURATION: 96 % | DIASTOLIC BLOOD PRESSURE: 73 MMHG | HEART RATE: 88 BPM

## 2023-12-15 VITALS
OXYGEN SATURATION: 100 % | RESPIRATION RATE: 20 BRPM | SYSTOLIC BLOOD PRESSURE: 128 MMHG | DIASTOLIC BLOOD PRESSURE: 66 MMHG | HEART RATE: 80 BPM

## 2023-12-15 VITALS — BODY MASS INDEX: 50.5 KG/M2 | BODY MASS INDEX: 48.9 KG/M2 | BODY MASS INDEX: 49.6 KG/M2 | BODY MASS INDEX: 49.8 KG/M2

## 2023-12-15 DIAGNOSIS — F33.9: ICD-10-CM

## 2023-12-15 DIAGNOSIS — I25.118: ICD-10-CM

## 2023-12-15 DIAGNOSIS — I10: ICD-10-CM

## 2023-12-15 DIAGNOSIS — R11.2: Primary | ICD-10-CM

## 2023-12-15 DIAGNOSIS — N39.0: ICD-10-CM

## 2023-12-15 DIAGNOSIS — K44.9: ICD-10-CM

## 2023-12-15 DIAGNOSIS — E87.6: ICD-10-CM

## 2023-12-15 DIAGNOSIS — E11.9: ICD-10-CM

## 2023-12-15 DIAGNOSIS — E78.2: ICD-10-CM

## 2023-12-15 DIAGNOSIS — Z79.899: ICD-10-CM

## 2023-12-15 DIAGNOSIS — R39.89: ICD-10-CM

## 2023-12-15 DIAGNOSIS — K22.2: ICD-10-CM

## 2023-12-15 LAB
ALBUMIN LEVEL: 3.9 G/DL (ref 3.5–5)
ALBUMIN/GLOB SERPL: 1.3 {RATIO} (ref 1.1–1.8)
ALP ISO SERPL-ACNC: 131 U/L (ref 38–126)
ALT SERPLBLD-CCNC: 29 U/L (ref 12–78)
ANION GAP SERPL CALC-SCNC: 12.3 MEQ/L (ref 5–15)
AST SERPL QL: 36 U/L (ref 14–36)
BILIRUBIN,TOTAL: 0.9 MG/DL (ref 0.2–1.3)
BUN SERPL-MCNC: 11 MG/DL (ref 7–17)
CALCIUM SPEC-MCNC: 8.3 MG/DL (ref 8.4–10.2)
CHLORIDE SPEC-SCNC: 99 MMOL/L (ref 98–107)
CO2 SERPL-SCNC: 24 MMOL/L (ref 22–30)
CREAT BLD-SCNC: 0.8 MG/DL (ref 0.52–1.04)
CREATININE CLEARANCE ESTIMATED: 37 ML/MIN (ref 50–200)
ESTIMATED GLOMERULAR FILT RATE: 72 ML/MIN (ref 60–?)
GFR (AFRICAN AMERICAN): 87 ML/MIN (ref 60–?)
GLOBULIN SER CALC-MCNC: 2.9 G/DL (ref 1.3–3.2)
GLUCOSE: 100 MG/DL (ref 74–100)
HCT VFR BLD CALC: 39.5 % (ref 37–47)
HGB BLD-MCNC: 12.9 G/DL (ref 12.2–16.2)
MAGNESIUM: 1.5 MG/DL (ref 1.6–2.3)
MAGNESIUM: 1.6 MG/DL (ref 1.6–2.3)
MCHC RBC-ENTMCNC: 32.6 G/DL (ref 31.8–35.4)
MCV RBC: 89.5 FL (ref 81–99)
MEAN CORPUSCULAR HEMOGLOBIN: 29.2 PG (ref 27–31.2)
PLATELET # BLD: 420 K/MM3 (ref 142–424)
POTASSIUM: 3.3 MMOL/L (ref 3.5–5.1)
PROT SERPL-MCNC: 6.8 G/DL (ref 6.3–8.2)
RBC # BLD AUTO: 4.41 M/MM3 (ref 4.2–5.4)
SODIUM SPEC-SCNC: 132 MMOL/L (ref 136–145)
TROPONIN I: < 0.01 NG/ML (ref 0–0.03)
WBC # BLD AUTO: 10.2 K/MM3 (ref 4.8–10.8)

## 2023-12-15 PROCEDURE — 83735 ASSAY OF MAGNESIUM: CPT

## 2023-12-15 PROCEDURE — 84484 ASSAY OF TROPONIN QUANT: CPT

## 2023-12-15 PROCEDURE — G0378 HOSPITAL OBSERVATION PER HR: HCPCS

## 2023-12-15 PROCEDURE — 99285 EMERGENCY DEPT VISIT HI MDM: CPT

## 2023-12-15 PROCEDURE — 82962 GLUCOSE BLOOD TEST: CPT

## 2023-12-15 PROCEDURE — 80053 COMPREHEN METABOLIC PANEL: CPT

## 2023-12-15 PROCEDURE — 97166 OT EVAL MOD COMPLEX 45 MIN: CPT

## 2023-12-15 PROCEDURE — 97163 PT EVAL HIGH COMPLEX 45 MIN: CPT

## 2023-12-15 PROCEDURE — 83605 ASSAY OF LACTIC ACID: CPT

## 2023-12-15 PROCEDURE — 94640 AIRWAY INHALATION TREATMENT: CPT

## 2023-12-15 PROCEDURE — 85025 COMPLETE CBC W/AUTO DIFF WBC: CPT

## 2023-12-15 PROCEDURE — 71045 X-RAY EXAM CHEST 1 VIEW: CPT

## 2023-12-15 PROCEDURE — 97530 THERAPEUTIC ACTIVITIES: CPT

## 2023-12-15 PROCEDURE — 87086 URINE CULTURE/COLONY COUNT: CPT

## 2023-12-15 PROCEDURE — 80048 BASIC METABOLIC PNL TOTAL CA: CPT

## 2023-12-15 PROCEDURE — 36415 COLL VENOUS BLD VENIPUNCTURE: CPT

## 2023-12-16 VITALS
SYSTOLIC BLOOD PRESSURE: 142 MMHG | OXYGEN SATURATION: 96 % | RESPIRATION RATE: 16 BRPM | HEART RATE: 87 BPM | DIASTOLIC BLOOD PRESSURE: 79 MMHG | TEMPERATURE: 98.2 F

## 2023-12-16 VITALS
RESPIRATION RATE: 19 BRPM | SYSTOLIC BLOOD PRESSURE: 120 MMHG | DIASTOLIC BLOOD PRESSURE: 47 MMHG | HEART RATE: 79 BPM | TEMPERATURE: 97.88 F | OXYGEN SATURATION: 95 %

## 2023-12-16 VITALS
DIASTOLIC BLOOD PRESSURE: 77 MMHG | SYSTOLIC BLOOD PRESSURE: 120 MMHG | HEART RATE: 75 BPM | TEMPERATURE: 97.4 F | RESPIRATION RATE: 16 BRPM | OXYGEN SATURATION: 97 %

## 2023-12-16 VITALS
RESPIRATION RATE: 17 BRPM | OXYGEN SATURATION: 94 % | SYSTOLIC BLOOD PRESSURE: 113 MMHG | TEMPERATURE: 98.2 F | HEART RATE: 95 BPM | DIASTOLIC BLOOD PRESSURE: 62 MMHG

## 2023-12-16 VITALS — OXYGEN SATURATION: 90 %

## 2023-12-16 VITALS — OXYGEN SATURATION: 95 %

## 2023-12-16 VITALS — OXYGEN SATURATION: 97 %

## 2023-12-16 LAB
ALBUMIN LEVEL: 3.6 G/DL (ref 3.5–5)
ALBUMIN/GLOB SERPL: 1.3 {RATIO} (ref 1.1–1.8)
ALP ISO SERPL-ACNC: 146 U/L (ref 38–126)
ALT SERPLBLD-CCNC: 25 U/L (ref 12–78)
ANION GAP SERPL CALC-SCNC: 7.5 MEQ/L (ref 5–15)
AST SERPL QL: 27 U/L (ref 14–36)
BILIRUBIN,TOTAL: 0.8 MG/DL (ref 0.2–1.3)
BUN SERPL-MCNC: 10 MG/DL (ref 7–17)
CALCIUM SPEC-MCNC: 8.2 MG/DL (ref 8.4–10.2)
CHLORIDE SPEC-SCNC: 100 MMOL/L (ref 98–107)
CO2 SERPL-SCNC: 30 MMOL/L (ref 22–30)
CREAT BLD-SCNC: 0.8 MG/DL (ref 0.52–1.04)
CREATININE CLEARANCE ESTIMATED: 37 ML/MIN (ref 50–200)
ESTIMATED GLOMERULAR FILT RATE: 72 ML/MIN (ref 60–?)
GFR (AFRICAN AMERICAN): 87 ML/MIN (ref 60–?)
GLOBULIN SER CALC-MCNC: 2.7 G/DL (ref 1.3–3.2)
GLUCOSE BLDC GLUCOMTR-MCNC: 100 MG/DL (ref 70–110)
GLUCOSE BLDC GLUCOMTR-MCNC: 103 MG/DL (ref 70–110)
GLUCOSE: 99 MG/DL (ref 74–100)
HCT VFR BLD CALC: 38.2 % (ref 37–47)
HGB BLD-MCNC: 12.2 G/DL (ref 12.2–16.2)
LACTATE SERPL-MCNC: 3 MMOL/L (ref 0.7–2.1)
LACTIC ACID FOLLOW UP (RFLX 2): 1.6 MMOL/L (ref 0.7–2.1)
MAGNESIUM: 1.5 MG/DL (ref 1.6–2.3)
MCHC RBC-ENTMCNC: 31.9 G/DL (ref 31.8–35.4)
MCV RBC: 90.6 FL (ref 81–99)
MEAN CORPUSCULAR HEMOGLOBIN: 28.9 PG (ref 27–31.2)
PLATELET # BLD: 406 K/MM3 (ref 142–424)
POTASSIUM: 3.5 MMOL/L (ref 3.5–5.1)
PROT SERPL-MCNC: 6.3 G/DL (ref 6.3–8.2)
RBC # BLD AUTO: 4.21 M/MM3 (ref 4.2–5.4)
REFLEX LACTIC (2 HRS): (no result)
REFLEX LACTIC: (no result)
SODIUM SPEC-SCNC: 134 MMOL/L (ref 136–145)
TROPONIN I: < 0.01 NG/ML (ref 0–0.03)
TROPONIN I: < 0.01 NG/ML (ref 0–0.03)
WBC # BLD AUTO: 9.6 K/MM3 (ref 4.8–10.8)

## 2023-12-17 VITALS — OXYGEN SATURATION: 92 %

## 2023-12-17 VITALS
OXYGEN SATURATION: 96 % | TEMPERATURE: 98.8 F | DIASTOLIC BLOOD PRESSURE: 81 MMHG | RESPIRATION RATE: 18 BRPM | HEART RATE: 78 BPM | SYSTOLIC BLOOD PRESSURE: 139 MMHG

## 2023-12-17 VITALS
DIASTOLIC BLOOD PRESSURE: 69 MMHG | HEART RATE: 82 BPM | OXYGEN SATURATION: 92 % | RESPIRATION RATE: 16 BRPM | TEMPERATURE: 99.4 F | SYSTOLIC BLOOD PRESSURE: 116 MMHG

## 2023-12-17 VITALS
RESPIRATION RATE: 19 BRPM | HEART RATE: 84 BPM | OXYGEN SATURATION: 96 % | TEMPERATURE: 97.6 F | DIASTOLIC BLOOD PRESSURE: 73 MMHG | SYSTOLIC BLOOD PRESSURE: 126 MMHG

## 2023-12-17 VITALS
DIASTOLIC BLOOD PRESSURE: 68 MMHG | OXYGEN SATURATION: 94 % | RESPIRATION RATE: 20 BRPM | TEMPERATURE: 98.96 F | HEART RATE: 90 BPM | SYSTOLIC BLOOD PRESSURE: 128 MMHG

## 2023-12-17 VITALS — OXYGEN SATURATION: 96 %

## 2023-12-17 VITALS — OXYGEN SATURATION: 94 %

## 2023-12-17 LAB
GLUCOSE BLDC GLUCOMTR-MCNC: 101 MG/DL (ref 70–110)
GLUCOSE BLDC GLUCOMTR-MCNC: 114 MG/DL (ref 70–110)
GLUCOSE BLDC GLUCOMTR-MCNC: 160 MG/DL (ref 70–110)
GLUCOSE BLDC GLUCOMTR-MCNC: 91 MG/DL (ref 70–110)

## 2023-12-18 VITALS
RESPIRATION RATE: 18 BRPM | TEMPERATURE: 97.6 F | DIASTOLIC BLOOD PRESSURE: 74 MMHG | HEART RATE: 75 BPM | SYSTOLIC BLOOD PRESSURE: 114 MMHG | OXYGEN SATURATION: 94 %

## 2023-12-18 VITALS
RESPIRATION RATE: 18 BRPM | HEART RATE: 85 BPM | DIASTOLIC BLOOD PRESSURE: 61 MMHG | OXYGEN SATURATION: 91 % | SYSTOLIC BLOOD PRESSURE: 130 MMHG | TEMPERATURE: 98 F

## 2023-12-18 VITALS — OXYGEN SATURATION: 96 %

## 2023-12-18 LAB
ANION GAP SERPL CALC-SCNC: 11.2 MEQ/L (ref 5–15)
BUN SERPL-MCNC: 8 MG/DL (ref 7–17)
CALCIUM SPEC-MCNC: 7.9 MG/DL (ref 8.4–10.2)
CHLORIDE SPEC-SCNC: 102 MMOL/L (ref 98–107)
CO2 SERPL-SCNC: 27 MMOL/L (ref 22–30)
CREAT BLD-SCNC: 0.8 MG/DL (ref 0.52–1.04)
CREATININE CLEARANCE ESTIMATED: 37 ML/MIN (ref 50–200)
ESTIMATED GLOMERULAR FILT RATE: 72 ML/MIN (ref 60–?)
GFR (AFRICAN AMERICAN): 87 ML/MIN (ref 60–?)
GLUCOSE BLDC GLUCOMTR-MCNC: 136 MG/DL (ref 70–110)
GLUCOSE BLDC GLUCOMTR-MCNC: 196 MG/DL (ref 70–110)
GLUCOSE: 107 MG/DL (ref 74–100)
HCT VFR BLD CALC: 33.5 % (ref 37–47)
HGB BLD-MCNC: 11.4 G/DL (ref 12.2–16.2)
MAGNESIUM: 1.7 MG/DL (ref 1.6–2.3)
MCHC RBC-ENTMCNC: 34.1 G/DL (ref 31.8–35.4)
MCV RBC: 90.4 FL (ref 81–99)
MEAN CORPUSCULAR HEMOGLOBIN: 30.9 PG (ref 27–31.2)
PLATELET # BLD: 369 K/MM3 (ref 142–424)
POTASSIUM: 3.2 MMOL/L (ref 3.5–5.1)
RBC # BLD AUTO: 3.7 M/MM3 (ref 4.2–5.4)
SODIUM SPEC-SCNC: 137 MMOL/L (ref 136–145)
WBC # BLD AUTO: 7.1 K/MM3 (ref 4.8–10.8)

## 2023-12-28 ENCOUNTER — HOSPITAL ENCOUNTER (OUTPATIENT)
Dept: HOSPITAL 22 - LAB.DROPOF | Age: 68
End: 2023-12-28
Payer: MEDICARE

## 2023-12-28 DIAGNOSIS — B96.29: ICD-10-CM

## 2023-12-28 DIAGNOSIS — N39.0: Primary | ICD-10-CM

## 2023-12-28 LAB
BACTERIA URNS QL MICRO: (no result) /LPF
COLOR UR: YELLOW
LEUKOCYTE ESTERASE UR QL STRIP: (no result)
MICRO URNS: (no result)
PH UR: 6 [PH] (ref 5–8.5)
SP GR UR: >= 1.03 (ref 1–1.03)
UROBILINOGEN UR QL: 0.2 EU/DL
WBC # UR: (no result) #/HPF (ref 0–3)

## 2023-12-28 PROCEDURE — 81001 URINALYSIS AUTO W/SCOPE: CPT

## 2023-12-28 PROCEDURE — 87086 URINE CULTURE/COLONY COUNT: CPT

## 2024-07-06 ENCOUNTER — HOSPITAL ENCOUNTER (OUTPATIENT)
Dept: HOSPITAL 22 - LAB | Age: 69
Discharge: HOME | End: 2024-07-06
Payer: MEDICARE

## 2024-07-06 DIAGNOSIS — E11.9: Primary | ICD-10-CM

## 2024-07-06 DIAGNOSIS — I10: ICD-10-CM

## 2024-07-06 DIAGNOSIS — E78.5: ICD-10-CM

## 2024-07-06 LAB
ALBUMIN LEVEL: 4 G/DL (ref 3.5–5)
ALBUMIN/GLOB SERPL: 1.5 {RATIO} (ref 1.1–1.8)
ALP ISO SERPL-ACNC: 173 U/L (ref 38–126)
ALT SERPLBLD-CCNC: 29 U/L (ref 12–78)
ANION GAP SERPL CALC-SCNC: 10.4 MEQ/L (ref 5–15)
AST SERPL QL: 27 U/L (ref 14–36)
BILIRUBIN,TOTAL: 0.5 MG/DL (ref 0.2–1.3)
BUN SERPL-MCNC: 13 MG/DL (ref 7–17)
CALCIUM SPEC-MCNC: 10 MG/DL (ref 8.4–10.2)
CHLORIDE SPEC-SCNC: 111 MMOL/L (ref 98–107)
CHOLEST SPEC-SCNC: 148 MG/DL (ref 140–200)
CO2 SERPL-SCNC: 23 MMOL/L (ref 22–30)
CREAT BLD-SCNC: 0.9 MG/DL (ref 0.52–1.04)
ESTIMATED GLOMERULAR FILT RATE: 62 ML/MIN (ref 60–?)
GFR (AFRICAN AMERICAN): 75 ML/MIN (ref 60–?)
GLOBULIN SER CALC-MCNC: 2.6 G/DL (ref 1.3–3.2)
GLUCOSE: 103 MG/DL (ref 74–100)
HBA1C MFR BLD: 6.9 % (ref 4–6)
HDLC SERPL-MCNC: 59 MG/DL (ref 40–60)
POTASSIUM: 4.4 MMOL/L (ref 3.5–5.1)
PROT SERPL-MCNC: 6.6 G/DL (ref 6.3–8.2)
SODIUM SPEC-SCNC: 140 MMOL/L (ref 136–145)
TRIGLYCERIDES: 121 MG/DL (ref 30–150)

## 2024-07-06 PROCEDURE — 36415 COLL VENOUS BLD VENIPUNCTURE: CPT

## 2024-07-06 PROCEDURE — 80053 COMPREHEN METABOLIC PANEL: CPT

## 2024-07-06 PROCEDURE — 80061 LIPID PANEL: CPT

## 2024-07-06 PROCEDURE — 83036 HEMOGLOBIN GLYCOSYLATED A1C: CPT

## 2024-07-18 ENCOUNTER — HOSPITAL ENCOUNTER (EMERGENCY)
Age: 69
Discharge: HOME | End: 2024-07-18
Payer: MEDICARE

## 2024-07-18 VITALS — DIASTOLIC BLOOD PRESSURE: 47 MMHG | OXYGEN SATURATION: 96 % | HEART RATE: 77 BPM | SYSTOLIC BLOOD PRESSURE: 94 MMHG

## 2024-07-18 VITALS
HEART RATE: 70 BPM | DIASTOLIC BLOOD PRESSURE: 59 MMHG | SYSTOLIC BLOOD PRESSURE: 102 MMHG | RESPIRATION RATE: 18 BRPM | TEMPERATURE: 97.88 F | OXYGEN SATURATION: 98 %

## 2024-07-18 VITALS — BODY MASS INDEX: 45.4 KG/M2

## 2024-07-18 VITALS — DIASTOLIC BLOOD PRESSURE: 59 MMHG | HEART RATE: 85 BPM | SYSTOLIC BLOOD PRESSURE: 122 MMHG | OXYGEN SATURATION: 95 %

## 2024-07-18 VITALS
OXYGEN SATURATION: 95 % | HEART RATE: 66 BPM | RESPIRATION RATE: 18 BRPM | DIASTOLIC BLOOD PRESSURE: 63 MMHG | SYSTOLIC BLOOD PRESSURE: 115 MMHG | TEMPERATURE: 98 F

## 2024-07-18 DIAGNOSIS — S50.02XA: ICD-10-CM

## 2024-07-18 DIAGNOSIS — Z95.5: ICD-10-CM

## 2024-07-18 DIAGNOSIS — I11.9: ICD-10-CM

## 2024-07-18 DIAGNOSIS — Z86.73: ICD-10-CM

## 2024-07-18 DIAGNOSIS — H11.31: Primary | ICD-10-CM

## 2024-07-18 DIAGNOSIS — I25.119: ICD-10-CM
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
## 2024-07-18 DIAGNOSIS — I65.29: ICD-10-CM

## 2024-07-18 LAB
ALBUMIN LEVEL: 3.6 G/DL (ref 3.5–5)
ALBUMIN/GLOB SERPL: 1.3 {RATIO} (ref 1.1–1.8)
ALP ISO SERPL-ACNC: 122 U/L (ref 38–126)
ALT SERPLBLD-CCNC: 25 U/L (ref 12–78)
ANION GAP SERPL CALC-SCNC: 9.2 MEQ/L (ref 5–15)
AST SERPL QL: 32 U/L (ref 14–36)
BILIRUBIN,TOTAL: 0.6 MG/DL (ref 0.2–1.3)
BUN SERPL-MCNC: 14 MG/DL (ref 7–17)
CALCIUM SPEC-MCNC: 9.1 MG/DL (ref 8.4–10.2)
CHLORIDE SPEC-SCNC: 111 MMOL/L (ref 98–107)
CO2 SERPL-SCNC: 23 MMOL/L (ref 22–30)
CREAT BLD-SCNC: 0.9 MG/DL (ref 0.52–1.04)
CREATININE CLEARANCE ESTIMATED: 37 ML/MIN (ref 50–200)
ESTIMATED GLOMERULAR FILT RATE: 62 ML/MIN (ref 60–?)
GFR (AFRICAN AMERICAN): 75 ML/MIN (ref 60–?)
GLOBULIN SER CALC-MCNC: 2.8 G/DL (ref 1.3–3.2)
GLUCOSE: 127 MG/DL (ref 74–100)
HCT VFR BLD CALC: 39.6 % (ref 37–47)
HGB BLD-MCNC: 13 G/DL (ref 12.2–16.2)
INR PPP: 0.91 (ref 0.9–1.1)
MCHC RBC-ENTMCNC: 32.8 G/DL (ref 31.8–35.4)
MCV RBC: 90.5 FL (ref 81–99)
MEAN CORPUSCULAR HEMOGLOBIN: 29.6 PG (ref 27–31.2)
PLATELET # BLD: 313 K/MM3 (ref 142–424)
POTASSIUM: 4.2 MMOL/L (ref 3.5–5.1)
PROT SERPL-MCNC: 6.4 G/DL (ref 6.3–8.2)
PT BLD: 10.3 SECONDS (ref 10.1–12.5)
RBC # BLD AUTO: 4.38 M/MM3 (ref 4.2–5.4)
SODIUM SPEC-SCNC: 139 MMOL/L (ref 136–145)
WBC # BLD AUTO: 8.9 K/MM3 (ref 4.8–10.8)

## 2024-07-18 PROCEDURE — 85610 PROTHROMBIN TIME: CPT

## 2024-07-18 PROCEDURE — 70486 CT MAXILLOFACIAL W/O DYE: CPT

## 2024-07-18 PROCEDURE — 80053 COMPREHEN METABOLIC PANEL: CPT

## 2024-07-18 PROCEDURE — 73080 X-RAY EXAM OF ELBOW: CPT

## 2024-07-18 PROCEDURE — 85025 COMPLETE CBC W/AUTO DIFF WBC: CPT

## 2024-07-18 PROCEDURE — 96361 HYDRATE IV INFUSION ADD-ON: CPT

## 2024-07-18 PROCEDURE — 70450 CT HEAD/BRAIN W/O DYE: CPT

## 2024-07-18 PROCEDURE — 96374 THER/PROPH/DIAG INJ IV PUSH: CPT

## 2024-07-18 PROCEDURE — 99285 EMERGENCY DEPT VISIT HI MDM: CPT

## 2024-07-18 PROCEDURE — 72125 CT NECK SPINE W/O DYE: CPT

## 2024-10-10 ENCOUNTER — HOSPITAL ENCOUNTER (OUTPATIENT)
Dept: HOSPITAL 22 - RAD | Age: 69
Discharge: HOME | End: 2024-10-10
Payer: MEDICARE

## 2024-10-10 DIAGNOSIS — Z12.31: Primary | ICD-10-CM

## 2024-10-10 PROCEDURE — 77063 BREAST TOMOSYNTHESIS BI: CPT

## 2024-10-10 PROCEDURE — 77067 SCR MAMMO BI INCL CAD: CPT

## 2024-12-10 ENCOUNTER — HOSPITAL ENCOUNTER (OUTPATIENT)
Dept: HOSPITAL 22 - LAB | Age: 69
Discharge: HOME | End: 2024-12-10
Payer: MEDICARE

## 2024-12-10 DIAGNOSIS — I10: ICD-10-CM

## 2024-12-10 DIAGNOSIS — E78.5: ICD-10-CM

## 2024-12-10 DIAGNOSIS — E11.9: Primary | ICD-10-CM

## 2024-12-10 DIAGNOSIS — E03.9: ICD-10-CM

## 2024-12-10 LAB
ALBUMIN LEVEL: 3.9 G/DL (ref 3.5–5)
ALBUMIN/GLOB SERPL: 1.7 {RATIO} (ref 1.1–1.8)
ALP ISO SERPL-ACNC: 149 U/L (ref 38–126)
ALT SERPLBLD-CCNC: 25 U/L (ref 12–78)
ANION GAP SERPL CALC-SCNC: 12.1 MEQ/L (ref 5–15)
AST SERPL QL: 30 U/L (ref 14–36)
BILIRUBIN,TOTAL: 0.7 MG/DL (ref 0.2–1.3)
BUN SERPL-MCNC: 15 MG/DL (ref 7–17)
CALCIUM SPEC-MCNC: 9.4 MG/DL (ref 8.4–10.2)
CHLORIDE SPEC-SCNC: 111 MMOL/L (ref 98–107)
CHOLEST SPEC-SCNC: 157 MG/DL (ref 140–200)
CO2 SERPL-SCNC: 21 MMOL/L (ref 22–30)
CREAT BLD-SCNC: 0.8 MG/DL (ref 0.52–1.04)
ESTIMATED GLOMERULAR FILT RATE: 71 ML/MIN (ref 60–?)
GFR (AFRICAN AMERICAN): 86 ML/MIN (ref 60–?)
GLOBULIN SER CALC-MCNC: 2.3 G/DL (ref 1.3–3.2)
GLUCOSE: 104 MG/DL (ref 74–100)
HBA1C MFR BLD: 5.7 % (ref 4–6)
HDLC SERPL-MCNC: 56 MG/DL (ref 40–60)
POTASSIUM: 4.1 MMOL/L (ref 3.5–5.1)
PROT SERPL-MCNC: 6.2 G/DL (ref 6.3–8.2)
SODIUM SPEC-SCNC: 140 MMOL/L (ref 136–145)
T4 (THYROXINE): 8.5 UG/DL (ref 5.53–11)
TRIGLYCERIDES: 131 MG/DL (ref 30–150)
TSH SERPL-ACNC: 0.25 UIU/ML (ref 0.47–4.68)

## 2024-12-10 PROCEDURE — 80061 LIPID PANEL: CPT

## 2024-12-10 PROCEDURE — 84436 ASSAY OF TOTAL THYROXINE: CPT

## 2024-12-10 PROCEDURE — 83036 HEMOGLOBIN GLYCOSYLATED A1C: CPT

## 2024-12-10 PROCEDURE — 36415 COLL VENOUS BLD VENIPUNCTURE: CPT

## 2024-12-10 PROCEDURE — 84443 ASSAY THYROID STIM HORMONE: CPT

## 2024-12-10 PROCEDURE — 80053 COMPREHEN METABOLIC PANEL: CPT

## 2024-12-11 ENCOUNTER — HOSPITAL ENCOUNTER (OUTPATIENT)
Dept: HOSPITAL 22 - SC.PAIN | Age: 69
Discharge: HOME | End: 2024-12-11
Payer: MEDICARE

## 2024-12-11 VITALS
DIASTOLIC BLOOD PRESSURE: 67 MMHG | OXYGEN SATURATION: 97 % | HEART RATE: 86 BPM | SYSTOLIC BLOOD PRESSURE: 144 MMHG | RESPIRATION RATE: 20 BRPM

## 2024-12-11 VITALS — BODY MASS INDEX: 45.4 KG/M2

## 2024-12-11 DIAGNOSIS — M51.16: Primary | ICD-10-CM

## 2024-12-11 DIAGNOSIS — Z95.2: ICD-10-CM

## 2024-12-11 DIAGNOSIS — Z79.899: ICD-10-CM

## 2024-12-11 DIAGNOSIS — Z73.89: ICD-10-CM

## 2024-12-11 PROCEDURE — 82570 ASSAY OF URINE CREATININE: CPT

## 2024-12-11 PROCEDURE — 82043 UR ALBUMIN QUANTITATIVE: CPT

## 2024-12-11 PROCEDURE — 99202 OFFICE O/P NEW SF 15 MIN: CPT

## 2024-12-11 PROCEDURE — G0463 HOSPITAL OUTPT CLINIC VISIT: HCPCS

## 2024-12-12 ENCOUNTER — HOSPITAL ENCOUNTER (OUTPATIENT)
Dept: HOSPITAL 22 - RT | Age: 69
Discharge: HOME | End: 2024-12-12
Payer: MEDICARE

## 2024-12-12 DIAGNOSIS — Z95.2: Primary | ICD-10-CM

## 2024-12-12 PROCEDURE — 93306 TTE W/DOPPLER COMPLETE: CPT

## 2024-12-19 ENCOUNTER — HOSPITAL ENCOUNTER (OUTPATIENT)
Dept: HOSPITAL 22 - PT | Age: 69
Discharge: HOME | End: 2024-12-19
Payer: MEDICARE

## 2024-12-19 DIAGNOSIS — R26.2: Primary | ICD-10-CM

## 2024-12-19 DIAGNOSIS — M62.81: ICD-10-CM

## 2024-12-19 DIAGNOSIS — I69.30: ICD-10-CM

## 2024-12-19 PROCEDURE — 97110 THERAPEUTIC EXERCISES: CPT

## 2024-12-19 PROCEDURE — 97530 THERAPEUTIC ACTIVITIES: CPT

## 2024-12-19 PROCEDURE — 97163 PT EVAL HIGH COMPLEX 45 MIN: CPT

## 2024-12-19 PROCEDURE — 97112 NEUROMUSCULAR REEDUCATION: CPT

## 2025-01-21 ENCOUNTER — HOSPITAL ENCOUNTER (OUTPATIENT)
Age: 70
Discharge: HOME | End: 2025-01-21
Payer: MEDICARE

## 2025-01-21 VITALS
SYSTOLIC BLOOD PRESSURE: 154 MMHG | RESPIRATION RATE: 18 BRPM | DIASTOLIC BLOOD PRESSURE: 84 MMHG | HEART RATE: 74 BPM | OXYGEN SATURATION: 93 %

## 2025-01-21 VITALS
DIASTOLIC BLOOD PRESSURE: 70 MMHG | HEART RATE: 61 BPM | OXYGEN SATURATION: 96 % | SYSTOLIC BLOOD PRESSURE: 130 MMHG | RESPIRATION RATE: 18 BRPM

## 2025-01-21 VITALS
DIASTOLIC BLOOD PRESSURE: 70 MMHG | RESPIRATION RATE: 18 BRPM | OXYGEN SATURATION: 96 % | SYSTOLIC BLOOD PRESSURE: 143 MMHG | TEMPERATURE: 97.9 F | HEART RATE: 74 BPM

## 2025-01-21 VITALS
SYSTOLIC BLOOD PRESSURE: 154 MMHG | OXYGEN SATURATION: 93 % | RESPIRATION RATE: 18 BRPM | HEART RATE: 74 BPM | DIASTOLIC BLOOD PRESSURE: 84 MMHG

## 2025-01-21 VITALS — BODY MASS INDEX: 46.4 KG/M2

## 2025-01-21 DIAGNOSIS — M51.16: Primary | ICD-10-CM

## 2025-01-21 PROCEDURE — 62323 NJX INTERLAMINAR LMBR/SAC: CPT

## 2025-07-23 ENCOUNTER — HOSPITAL ENCOUNTER (OUTPATIENT)
Dept: HOSPITAL 22 - LAB | Age: 70
Discharge: HOME | End: 2025-07-23
Payer: MEDICARE

## 2025-07-23 DIAGNOSIS — I25.118: Primary | ICD-10-CM

## 2025-07-23 DIAGNOSIS — E11.9: ICD-10-CM

## 2025-07-23 DIAGNOSIS — I10: ICD-10-CM

## 2025-07-23 DIAGNOSIS — E03.9: ICD-10-CM

## 2025-07-23 LAB
ALBUMIN LEVEL: 4.1 G/DL (ref 3.5–5)
ALBUMIN LEVEL: 4.3 G/DL (ref 3.5–5)
ALBUMIN/GLOB SERPL: 1.9 {RATIO} (ref 1.1–1.8)
ALP ISO SERPL-ACNC: 160 U/L (ref 38–126)
ALP ISO SERPL-ACNC: 162 U/L (ref 38–126)
ALT SERPLBLD-CCNC: 20 U/L (ref 12–78)
ALT SERPLBLD-CCNC: 21 U/L (ref 12–78)
ANION GAP SERPL CALC-SCNC: 11.6 MEQ/L (ref 5–15)
AST SERPL QL: 25 U/L (ref 14–36)
AST SERPL QL: 25 U/L (ref 14–36)
BASOPHILS # BLD AUTO: 0 K/MM3 (ref 0–0.2)
BASOPHILS NFR BLD AUTO: 0.5 % (ref 0.1–2)
BILIRUB DIRECT SERPL-MCNC: 0.6 MG/DL (ref 0–0.4)
BILIRUB INDIRECT SERPL-MCNC: 0.5 MG/DL (ref 0–0.9)
BILIRUB INDIRECT SERPL-MCNC: 0.5 MG/DL (ref 0–1.1)
BILIRUBIN, CONJUGATED: 0 MG/DL (ref 0–0.3)
BILIRUBIN,TOTAL: 1.1 MG/DL (ref 0.2–1.3)
BILIRUBIN,TOTAL: 1.1 MG/DL (ref 0.2–1.3)
BUN SERPL-MCNC: 13 MG/DL (ref 7–17)
CALCIUM SPEC-MCNC: 10 MG/DL (ref 8.4–10.2)
CHLORIDE SPEC-SCNC: 104 MMOL/L (ref 98–107)
CHOLEST SPEC-SCNC: 139 MG/DL (ref 140–200)
CHOLEST/HDLC SERPL: 2.6 {RATIO} (ref 1–3.5)
CO2 SERPL-SCNC: 25 MMOL/L (ref 22–30)
CREAT BLD-SCNC: 0.8 MG/DL (ref 0.52–1.04)
DEPRECATED RDW RBC AUTO: 13 % (ref 11.5–17.5)
DIRECT LDL CHOLESTEROL: 55.14 MG/DL (ref 100–129)
EOSINOPHIL # BLD AUTO: 0.1 KMM3 (ref 0–0.4)
EOSINOPHIL NFR BLD AUTO: 1.9 % (ref 0.1–12)
ESTIMATED GLOMERULAR FILT RATE: 71 ML/MIN (ref 60–?)
GFR (AFRICAN AMERICAN): 86 ML/MIN (ref 60–?)
GLOBULIN SER CALC-MCNC: 2.3 G/DL (ref 1.3–3.2)
GLUCOSE: 103 MG/DL (ref 74–100)
HBA1C MFR BLD: 5.6 % (ref 4–6)
HCT VFR BLD AUTO: 44 % (ref 37–47)
HDLC SERPL ELPH-MCNC: 53 MG/DL (ref 40–60)
HGB BLD-MCNC: 14.2 G/DL (ref 12.2–16.2)
IMM GRANULOCYTES # BLD AUTO: 0.02 10^3UL
IMM GRANULOCYTES NFR BLD AUTO: 0.3 %
LYMPHOCYTES # SPEC AUTO: 1.4 K/MM3 (ref 0.7–4.5)
LYMPHOCYTES %: 24.9 % (ref 10–50)
MAGNESIUM: 1.5 MG/DL (ref 1.6–2.3)
MCH RBC QN AUTO: 29.4 PG (ref 27–31.2)
MCHC RBC-ENTMCNC: 32.3 G/DL (ref 31.8–35.4)
MCV RBC: 91.1 FL (ref 81–99)
MONOCYTES # BLD AUTO: 0.4 K/MM3 (ref 0.1–1)
MONOCYTES NFR BLD AUTO: 7.5 % (ref 1.7–9.3)
NEUTROPHILS # BLD AUTO: 3.7 K/MM3 (ref 1.8–7.8)
NEUTROPHILS NFR BLD AUTO: 64.9 % (ref 37–80)
NUCLEATED RED BLOOD CELLS #: 0 10^3/UL
NUCLEATED RED BLOOD CELLS %: 0 %
PLATELET # BLD: 278 K/MM3 (ref 142–424)
PMV BLD AUTO: 13 FL (ref 7.4–10.4)
POTASSIUM: 4.6 MMOL/L (ref 3.5–5.1)
PROT SERPL-MCNC: 6.5 G/DL (ref 6.3–8.2)
PROT SERPL-MCNC: 6.6 G/DL (ref 6.3–8.2)
RBC # BLD AUTO: 4.83 M/MM3 (ref 4.2–5.4)
RED CELL DISTRIBUTION WIDTH-SD: 43.7 FL
SODIUM SPEC-SCNC: 136 MMOL/L (ref 136–145)
T4 FREE SERPL-MCNC: 1.29 NG/DL (ref 0.78–2.19)
TRIGLYCERIDES: 128 MG/DL (ref 30–150)
TSH SERPL-ACNC: 0.14 UIU/ML (ref 0.47–4.68)
VLDLC SERPL-MCNC: 26 MG/DL (ref 0–40)
WBC # BLD AUTO: 5.8 K/MM3 (ref 4.8–10.8)
WBC NRBC COR # BLD: (no result) K/MM3 (ref 4.8–10.8)

## 2025-07-23 PROCEDURE — 85025 COMPLETE CBC W/AUTO DIFF WBC: CPT

## 2025-07-23 PROCEDURE — 84443 ASSAY THYROID STIM HORMONE: CPT

## 2025-07-23 PROCEDURE — 83036 HEMOGLOBIN GLYCOSYLATED A1C: CPT

## 2025-07-23 PROCEDURE — 80076 HEPATIC FUNCTION PANEL: CPT

## 2025-07-23 PROCEDURE — 36415 COLL VENOUS BLD VENIPUNCTURE: CPT

## 2025-07-23 PROCEDURE — 80053 COMPREHEN METABOLIC PANEL: CPT

## 2025-07-23 PROCEDURE — 84439 ASSAY OF FREE THYROXINE: CPT

## 2025-07-23 PROCEDURE — 80061 LIPID PANEL: CPT

## 2025-07-23 PROCEDURE — 83735 ASSAY OF MAGNESIUM: CPT
